# Patient Record
Sex: FEMALE | Race: BLACK OR AFRICAN AMERICAN | NOT HISPANIC OR LATINO | Employment: FULL TIME | URBAN - METROPOLITAN AREA
[De-identification: names, ages, dates, MRNs, and addresses within clinical notes are randomized per-mention and may not be internally consistent; named-entity substitution may affect disease eponyms.]

---

## 2017-03-10 ENCOUNTER — OFFICE VISIT (OUTPATIENT)
Dept: FAMILY MEDICINE | Facility: CLINIC | Age: 32
End: 2017-03-10
Payer: COMMERCIAL

## 2017-03-10 VITALS
SYSTOLIC BLOOD PRESSURE: 98 MMHG | WEIGHT: 198.44 LBS | OXYGEN SATURATION: 97 % | HEIGHT: 67 IN | TEMPERATURE: 98 F | DIASTOLIC BLOOD PRESSURE: 70 MMHG | HEART RATE: 70 BPM | BODY MASS INDEX: 31.15 KG/M2 | RESPIRATION RATE: 16 BRPM

## 2017-03-10 DIAGNOSIS — J35.1 ENLARGED TONSILS: ICD-10-CM

## 2017-03-10 DIAGNOSIS — R22.9 SUBCUTANEOUS MASS: Primary | ICD-10-CM

## 2017-03-10 PROCEDURE — 1160F RVW MEDS BY RX/DR IN RCRD: CPT | Mod: S$GLB,,, | Performed by: FAMILY MEDICINE

## 2017-03-10 PROCEDURE — 99214 OFFICE O/P EST MOD 30 MIN: CPT | Mod: S$GLB,,, | Performed by: FAMILY MEDICINE

## 2017-03-10 PROCEDURE — 99999 PR PBB SHADOW E&M-EST. PATIENT-LVL III: CPT | Mod: PBBFAC,,, | Performed by: FAMILY MEDICINE

## 2017-03-10 NOTE — PROGRESS NOTES
"Chief Complaint   Patient presents with    Sore Throat     off and on since the end of 02/2017    lt side under breast pain     off and on for long time        Rose Mayorga is a 31 y.o. female who presents per the Chief Complaint.  Pt is known to this practice but has not been seen by me previously.  All known chronic medical issues have been documented.          HPI     ROS  Review of Systems   Constitutional: Negative.  Negative for activity change, appetite change, chills, diaphoresis, fatigue, fever and unexpected weight change.   HENT: Positive for trouble swallowing. Negative for congestion, dental problem, drooling, ear discharge, ear pain, hearing loss, nosebleeds, postnasal drip, rhinorrhea, sinus pressure, sneezing and sore throat.    Eyes: Negative for pain and visual disturbance.   Respiratory: Negative for cough, choking and shortness of breath.    Cardiovascular: Negative for chest pain and leg swelling.   Gastrointestinal: Negative for abdominal pain, constipation, diarrhea, nausea and vomiting.   Genitourinary: Negative for difficulty urinating, dysuria, frequency and urgency.   Musculoskeletal: Negative.  Negative for arthralgias, back pain, gait problem, joint swelling and myalgias.   Skin: Negative.         Area under left breast appears bigger than right side   Allergic/Immunologic: Negative for environmental allergies and food allergies.   Neurological: Negative.  Negative for dizziness, seizures, syncope, weakness, light-headedness and headaches.   Psychiatric/Behavioral: Negative.  Negative for confusion, decreased concentration, dysphoric mood and sleep disturbance. The patient is not nervous/anxious.        Physical Exam  Vitals:    03/10/17 1032   BP: 98/70   Pulse: 70   Resp: 16   Temp: 98.3 °F (36.8 °C)    Body mass index is 31.08 kg/(m^2).  Weight: 90 kg (198 lb 6.6 oz)   Height: 5' 7" (170.2 cm)     Physical Exam   Constitutional: She is oriented to person, place, and time. She " appears well-developed and well-nourished. She is active and cooperative.  Non-toxic appearance. She does not have a sickly appearance. She does not appear ill. No distress.   HENT:   Head: Normocephalic and atraumatic.   Right Ear: Hearing and external ear normal. No decreased hearing is noted.   Left Ear: Hearing and external ear normal. No decreased hearing is noted.   Nose: Nose normal. No rhinorrhea or nasal deformity.   Mouth/Throat: Uvula is midline, oropharynx is clear and moist and mucous membranes are normal. She does not have dentures. Normal dentition. Tonsils are 3+ on the right. Tonsils are 3+ on the left.   Eyes: Conjunctivae, EOM and lids are normal. Pupils are equal, round, and reactive to light. Right eye exhibits no chemosis, no discharge and no exudate. No foreign body present in the right eye. Left eye exhibits no chemosis, no discharge and no exudate. No foreign body present in the left eye. No scleral icterus.   Neck: Normal range of motion and full passive range of motion without pain. Neck supple.   Cardiovascular: Normal rate, regular rhythm, S1 normal, S2 normal and normal heart sounds.  Exam reveals no gallop and no friction rub.    No murmur heard.  Pulmonary/Chest: Effort normal and breath sounds normal. No accessory muscle usage. No respiratory distress. She has no decreased breath sounds. She has no wheezes. She has no rhonchi. She has no rales.   Abdominal: Soft. Normal appearance. She exhibits no distension. There is no hepatosplenomegaly. There is no tenderness. There is no rigidity, no rebound and no guarding.   Musculoskeletal: Normal range of motion.   Neurological: She is alert and oriented to person, place, and time. She has normal strength. No cranial nerve deficit or sensory deficit. She exhibits normal muscle tone. She displays no seizure activity. Coordination and gait normal.   Skin: Skin is warm, dry and intact. No rash noted. She is not diaphoretic.        Psychiatric:  She has a normal mood and affect. Her speech is normal and behavior is normal. Judgment and thought content normal. Cognition and memory are normal. She is attentive.       Assessment & Plan    1. Subcutaneous mass  Likely a lipoma; Will order imaging to further evaluate and manage accordingly.   - US Soft Tissue Chest_Upper Back; Future    2. Enlarged tonsils  Refer for evaluation and management. Tonsils appear benign but will refer to ENT due to frequency of symptoms.  - Ambulatory Referral to ENT    Follow up documented    ACTIVE MEDICAL ISSUES:  Documented in Problem List    PAST MEDICAL HISTORY  Documented    PAST SURGICAL HISTORY:  Documented    SOCIAL HISTORY:  Documented    FAMILY HISTORY:  Documented    ALLERGIES AND MEDICATIONS: updated and reviewed.  Documented    Health Maintenance       Date Due Completion Date    Lipid Panel 1985 ---    TETANUS VACCINE 7/10/2002 ---    Influenza Vaccine 8/1/2016 4/23/2015 (Declined)    Override on 4/23/2015: Declined (4/23/15 pt decline)    Pap Smear 6/23/2017 6/23/2016

## 2017-04-20 ENCOUNTER — OFFICE VISIT (OUTPATIENT)
Dept: FAMILY MEDICINE | Facility: CLINIC | Age: 32
End: 2017-04-20
Payer: COMMERCIAL

## 2017-04-20 VITALS
OXYGEN SATURATION: 99 % | RESPIRATION RATE: 12 BRPM | TEMPERATURE: 99 F | SYSTOLIC BLOOD PRESSURE: 110 MMHG | BODY MASS INDEX: 31.01 KG/M2 | HEIGHT: 67 IN | WEIGHT: 197.56 LBS | HEART RATE: 73 BPM | DIASTOLIC BLOOD PRESSURE: 62 MMHG

## 2017-04-20 DIAGNOSIS — J02.0 STREP PHARYNGITIS: Primary | ICD-10-CM

## 2017-04-20 LAB
CTP QC/QA: YES
S PYO RRNA THROAT QL PROBE: POSITIVE

## 2017-04-20 PROCEDURE — 1160F RVW MEDS BY RX/DR IN RCRD: CPT | Mod: S$GLB,,, | Performed by: PHYSICIAN ASSISTANT

## 2017-04-20 PROCEDURE — 99214 OFFICE O/P EST MOD 30 MIN: CPT | Mod: S$GLB,,, | Performed by: PHYSICIAN ASSISTANT

## 2017-04-20 PROCEDURE — 87880 STREP A ASSAY W/OPTIC: CPT | Mod: QW,S$GLB,, | Performed by: PHYSICIAN ASSISTANT

## 2017-04-20 PROCEDURE — 99999 PR PBB SHADOW E&M-EST. PATIENT-LVL III: CPT | Mod: PBBFAC,,, | Performed by: PHYSICIAN ASSISTANT

## 2017-04-20 RX ORDER — AMOXICILLIN 500 MG/1
500 TABLET, FILM COATED ORAL EVERY 12 HOURS
Qty: 20 TABLET | Refills: 0 | Status: SHIPPED | OUTPATIENT
Start: 2017-04-20 | End: 2017-04-30

## 2017-04-20 RX ORDER — PREDNISONE 20 MG/1
20 TABLET ORAL DAILY
Qty: 5 TABLET | Refills: 0 | Status: SHIPPED | OUTPATIENT
Start: 2017-04-20 | End: 2017-04-30

## 2017-04-20 NOTE — PROGRESS NOTES
Subjective:       Patient ID: Rose Mayorga is a 31 y.o. female.    Chief Complaint: URI (having body aches and sore throat)    Sore Throat    This is a new problem. The current episode started in the past 7 days. The problem has been unchanged. The pain is worse on the left side. There has been no fever. The pain is moderate. Associated symptoms include headaches, swollen glands and trouble swallowing. Pertinent negatives include no congestion, coughing, diarrhea or ear pain. She has had no exposure to strep. Treatments tried: cold and flu. The treatment provided mild relief.     Social History     Social History    Marital status: Single     Spouse name: N/A    Number of children: N/A    Years of education: N/A     Occupational History    Not on file.     Social History Main Topics    Smoking status: Never Smoker    Smokeless tobacco: Never Used    Alcohol use Yes      Comment: socially    Drug use: No    Sexual activity: Yes     Partners: Male     Birth control/ protection: Condom     Other Topics Concern    Not on file     Social History Narrative       Review of Systems   HENT: Positive for sore throat (left side worse) and trouble swallowing. Negative for congestion, ear pain, rhinorrhea and sneezing.    Respiratory: Negative for cough.    Gastrointestinal: Negative for diarrhea.   Neurological: Positive for headaches.       Objective:      Physical Exam   Constitutional: She appears well-developed and well-nourished.   HENT:   Head: Normocephalic and atraumatic.   Mouth/Throat: Oropharyngeal exudate, posterior oropharyngeal edema and posterior oropharyngeal erythema present.   Cardiovascular: Normal rate and regular rhythm.    Pulmonary/Chest: Effort normal and breath sounds normal.   Psychiatric: She has a normal mood and affect. Her behavior is normal.   Vitals reviewed.      Assessment:       1. Strep pharyngitis        Plan:         Rose was seen today for uri.    Diagnoses and all orders  for this visit:    Strep pharyngitis  -     amoxicillin (AMOXIL) 500 MG Tab; Take 1 tablet (500 mg total) by mouth every 12 (twelve) hours.  -     predniSONE (DELTASONE) 20 MG tablet; Take 1 tablet (20 mg total) by mouth once daily.  -     Rapid strep positive   -     Increase fluids, tylenol or ibuprofen for fever and pain

## 2017-04-20 NOTE — LETTER
April 20, 2017    Rose Mayorga  [address] [place] [address]             [facility] [address] [place] [address]  Phone: [phone]  Fax: [phone] Rose Mayorga was seen in my clinic on 4/20/17. Please excuse her absence.  She may return to work on 4/24/17.    If you have any questions or concerns, please don't hesitate to call.    Sincerely,       Lidia Pedraza PA-C

## 2017-04-20 NOTE — MR AVS SNAPSHOT
[facility]  [address]  [place] [address]  Phone: [phone]  Fax: [phone]                  Rose Mayorga   2017 11:00 AM   Office Visit    Description:  Female : 1985   Provider:  Lidia Pedraza PA-C   Department:  [facility]           Reason for Visit     URI           Diagnoses this Visit        Comments    Strep pharyngitis    -  Primary            To Do List           Goals (5 Years of Data)     None       These Medications        Disp Refills Start End    amoxicillin (AMOXIL) 500 MG Tab 20 tablet 0 2017    Take 1 tablet (500 mg total) by mouth every 12 (twelve) hours. - Oral    Pharmacy: [facility] [id number] [place] [address] [place] [address] [facility] Ph #: [phone]         [facility] On [facility]     [facility] On Call Nurse Care Line -  Assistance  Unless otherwise directed by your provider, please contact [facility], our nurse care line that is available for  assistance.     Registered nurses in the [facility] On Call Center provide: appointment scheduling, clinical advisement, health education, and other advisory services.  Call: [phone] (toll free)               Medications           START taking these NEW medications        Refills    amoxicillin (AMOXIL) 500 MG Tab 0    Sig: Take 1 tablet (500 mg total) by mouth every 12 (twelve) hours.    Class: Normal    Route: Oral           Verify that the below list of medications is an accurate representation of the medications you are currently taking.  If none reported, the list may be blank. If incorrect, please contact your healthcare provider. Carry this list with you in case of emergency.           Current Medications     cetirizine (ZYRTEC) 10 MG tablet Take 1 tablet (10 mg total) by mouth once daily.    amoxicillin (AMOXIL) 500 MG Tab Take 1 tablet (500 mg total) by mouth every 12 (twelve) hours.           Clinical  "Reference Information           Your Vitals Were     BP Pulse Temp Resp Height Weight    110/62 (BP Location: Left arm, Patient Position: Sitting, BP Method: Manual) 73 98.8 °F (37.1 °C) (Oral) 12 5' 7" (1.702 m) 89.6 kg (197 lb 8.5 oz)    Last Period SpO2 BMI          04/11/2017 99% 30.94 kg/m2        Blood Pressure          Most Recent Value    BP  110/62      Allergies as of 4/20/2017     No Known Allergies      Immunizations Administered on Date of Encounter - 4/20/2017     None      [email or web address] Sign-Up     Activating your [facility] account is as easy as 1-2-3!     1) Visit [email or web address], select Sign Up Now, enter this activation code and your date of birth, then select Next.  Activation code not generated  Current Patient Portal Status: Account disabled      2) Create a username and password to use when you visit [facility] in the future and select a security question in case you lose your password and select Next.    3) Enter your e-mail address and click Sign Up!    Additional Information  If you have questions, please e-mail [email or web address] or call [phone] to talk to our [facility] staff. Remember, [facility] is NOT to be used for urgent needs. For medical emergencies, dial 911.         Language Assistance Services     ATTENTION: Language assistance services are available, free of charge. Please call [phone].      ATENCIÓN: Si habla español, tiene a smith disposición servicios gratuitos de asistencia lingüística. Llame al [phone].     CHÚ Ý: N?u b?n nói Ti?ng Vi?t, có các d?ch v? h? tr? ngôn ng? mi?n phí dành cho b?n. G?i s? [phone].         [facility] complies with applicable Federal civil rights laws and does not discriminate on the basis of race, color, national origin, age, disability, or sex.        "

## 2018-03-30 ENCOUNTER — OFFICE VISIT (OUTPATIENT)
Dept: URGENT CARE | Facility: CLINIC | Age: 33
End: 2018-03-30
Payer: COMMERCIAL

## 2018-03-30 VITALS
WEIGHT: 211 LBS | DIASTOLIC BLOOD PRESSURE: 78 MMHG | RESPIRATION RATE: 16 BRPM | HEIGHT: 67 IN | HEART RATE: 58 BPM | SYSTOLIC BLOOD PRESSURE: 112 MMHG | OXYGEN SATURATION: 99 % | BODY MASS INDEX: 33.12 KG/M2 | TEMPERATURE: 99 F

## 2018-03-30 DIAGNOSIS — J03.90 TONSILLITIS: ICD-10-CM

## 2018-03-30 DIAGNOSIS — J02.9 SORE THROAT: Primary | ICD-10-CM

## 2018-03-30 LAB
CTP QC/QA: YES
GLUCOSE SERPL-MCNC: 78 MG/DL (ref 70–110)
S PYO RRNA THROAT QL PROBE: NEGATIVE

## 2018-03-30 PROCEDURE — 99214 OFFICE O/P EST MOD 30 MIN: CPT | Mod: 25,S$GLB,, | Performed by: NURSE PRACTITIONER

## 2018-03-30 PROCEDURE — 87147 CULTURE TYPE IMMUNOLOGIC: CPT

## 2018-03-30 PROCEDURE — 87070 CULTURE OTHR SPECIMN AEROBIC: CPT

## 2018-03-30 PROCEDURE — 96372 THER/PROPH/DIAG INJ SC/IM: CPT | Mod: S$GLB,,, | Performed by: FAMILY MEDICINE

## 2018-03-30 PROCEDURE — 82948 REAGENT STRIP/BLOOD GLUCOSE: CPT | Mod: S$GLB,,, | Performed by: NURSE PRACTITIONER

## 2018-03-30 PROCEDURE — 87880 STREP A ASSAY W/OPTIC: CPT | Mod: QW,S$GLB,, | Performed by: NURSE PRACTITIONER

## 2018-03-30 RX ORDER — BETAMETHASONE SODIUM PHOSPHATE AND BETAMETHASONE ACETATE 3; 3 MG/ML; MG/ML
6 INJECTION, SUSPENSION INTRA-ARTICULAR; INTRALESIONAL; INTRAMUSCULAR; SOFT TISSUE
Status: COMPLETED | OUTPATIENT
Start: 2018-03-30 | End: 2018-03-30

## 2018-03-30 RX ORDER — AMOXICILLIN 875 MG/1
875 TABLET, FILM COATED ORAL 2 TIMES DAILY
Qty: 20 TABLET | Refills: 0 | Status: SHIPPED | OUTPATIENT
Start: 2018-03-30 | End: 2018-04-09

## 2018-03-30 RX ADMIN — BETAMETHASONE SODIUM PHOSPHATE AND BETAMETHASONE ACETATE 6 MG: 3; 3 INJECTION, SUSPENSION INTRA-ARTICULAR; INTRALESIONAL; INTRAMUSCULAR; SOFT TISSUE at 11:03

## 2018-03-30 NOTE — LETTER
March 30, 2018      [facility]  [facility] [place] [facility] [address]  Phone: [phone]  Fax: [phone]       Patient: Rose Mayorga   YOB: 1985  Date of Visit: 03/30/2018    To Whom It May Concern:    Daija Mayorga  was at [facility] on 03/30/2018. She may return to work/school on 4/2/18 with no restrictions. If you have any questions or concerns, or if I can be of further assistance, please do not hesitate to contact me.    Sincerely,    Leah De La Cruz NP

## 2018-03-30 NOTE — PROGRESS NOTES
"Subjective:       Patient ID: Rose Mayorga is a 32 y.o. female.    Vitals:  height is 5' 7" (1.702 m) and weight is 95.7 kg (211 lb). Her oral temperature is 98.7 °F (37.1 °C). Her blood pressure is 112/78 and her pulse is 58 (abnormal). Her respiration is 16 and oxygen saturation is 99%.     Chief Complaint: Sore Throat    Pt presents today with a sore throat for 3 days now. Pt states she also has had body aches and fatigue with slight chills. Pt denies any URI symptoms. Pt denies any recent otc drugs. Pt works at the [employer].       Sore Throat    This is a new problem. The current episode started in the past 7 days. The problem has been unchanged. The pain is worse on the right side. There has been no fever. The pain is at a severity of 6/10. The pain is mild. Associated symptoms include neck pain. Pertinent negatives include no abdominal pain, congestion, coughing, diarrhea, drooling, ear pain, headaches, hoarse voice, plugged ear sensation or shortness of breath. She has tried nothing for the symptoms.     Review of Systems   Constitution: Positive for chills and malaise/fatigue. Negative for weakness.   HENT: Positive for sore throat. Negative for congestion, drooling, ear pain and hoarse voice.    Eyes: Negative for discharge.   Respiratory: Negative for cough and shortness of breath.    Skin: Negative for flushing.   Musculoskeletal: Positive for neck pain.   Gastrointestinal: Negative for abdominal pain and diarrhea.   Genitourinary: Negative for dysuria.   Neurological: Negative for headaches.       Objective:      Physical Exam   Constitutional: She is oriented to person, place, and time. Vital signs are normal. She appears well-developed and well-nourished. She is cooperative.  Non-toxic appearance. She appears ill. No distress.   HENT:   Head: Normocephalic and atraumatic.   Right Ear: Hearing, tympanic membrane, external ear and ear canal normal.   Left Ear: Hearing, tympanic membrane, external " ear and ear canal normal.   Nose: Nose normal. No mucosal edema, rhinorrhea or nasal deformity. No epistaxis. Right sinus exhibits no maxillary sinus tenderness and no frontal sinus tenderness. Left sinus exhibits no maxillary sinus tenderness and no frontal sinus tenderness.   Mouth/Throat: Uvula is midline and mucous membranes are normal. No trismus in the jaw. Normal dentition. No uvula swelling. Posterior oropharyngeal erythema present. Tonsils are 2+ on the right. Tonsils are 2+ on the left. Tonsillar exudate (noted on the right tonsil).   Eyes: Conjunctivae, EOM and lids are normal. Pupils are equal, round, and reactive to light. No scleral icterus.   Sclera clear bilat   Neck: Trachea normal, normal range of motion, full passive range of motion without pain and phonation normal. Neck supple.   Cardiovascular: Normal rate, regular rhythm, normal heart sounds, intact distal pulses and normal pulses.    Pulmonary/Chest: Effort normal and breath sounds normal. No respiratory distress.   Abdominal: Soft. Normal appearance and bowel sounds are normal. She exhibits no distension. There is no tenderness.   Musculoskeletal: Normal range of motion. She exhibits no edema or deformity.   Lymphadenopathy:     She has cervical adenopathy.        Right cervical: Superficial cervical adenopathy present.        Left cervical: Superficial cervical adenopathy present.   Neurological: She is alert and oriented to person, place, and time. She exhibits normal muscle tone. Coordination normal.   Skin: Skin is warm, dry and intact. She is not diaphoretic. No pallor.   Psychiatric: She has a normal mood and affect. Her speech is normal and behavior is normal. Judgment and thought content normal. Cognition and memory are normal.   Nursing note and vitals reviewed.         Results for orders placed or performed in visit on 03/30/18   POCT rapid strep A   Result Value Ref Range    Rapid Strep A Screen Negative Negative    Quality  Control Acceptable Yes    POCT glucose   Result Value Ref Range    POC Glucose 78 70 - 110 mg/dL      Assessment:       1. Sore throat    2. Tonsillitis        Plan:         Sore throat  -     POCT rapid strep A  -     (Magic mouthwash) 1:1:1 Benadryl 12.5mg/5ml liq, aluminum & magnesium hydroxide-simehticone (Maalox), lidocaine viscous 2%; Swish and spit 5 mLs every 4 (four) hours as needed. For sore throat  Dispense: 90 mL; Refill: 0    Tonsillitis  -     amoxicillin (AMOXIL) 875 MG tablet; Take 1 tablet (875 mg total) by mouth 2 (two) times daily.  Dispense: 20 tablet; Refill: 0  -     POCT glucose      Patient Instructions                                                         Pharyngitis   If your condition worsens or fails to improve we recommend that you receive another evaluation at the ER immediately or contact your PCP to discuss your concerns or return here. You must understand that you've received an urgent care treatment only and that you may be released before all your medical problems are known or treated. You the patient will arrange for followup care as instructed.   The majority of all sore throats or tonsillitis are viral and antibiotics will not treat this.     If the strep test performed in office was Positive:  - Complete the full course of antibiotics given  - Drink plenty of cool liquids while avoid any beverage or food that can irritate your throat (acidic, spicy or salty foods).  - Throw away your toothbrush now and when you complete your antibiotics.    If the strep culture was done and returns negative in 3-5 days and you are still having a sore throat, you may need to get a mono spot test done or repeated.   Tylenol or ibuprofen for pain may help as long as you are not allergic to these meds or have a medical condition such as stomach ulcers, liver or kidney disease or taking blood thinners etc that would   prevent you from using these medications.   Rest and fluids will help as well.    If you were prescribed antibiotics and are female and on BCP use additional methods to prevent pregnancy while on the antibiotics and for one cycle after.     Pharyngitis (Sore Throat), Report Pending    Pharyngitis (sore throat) is often due to a virus. It can also be caused by the streptococcus, or strep, bacterium, often called strep throat. Both viral and strep infections can cause throat pain that is worse when swallowing, aching all over with headache, and fever. Both types of infections are contagious. They may be spread by coughing, kissing, or touching others after touching your mouth or nose.  A test has been done to find out whether you (or your child, if your child is the patient) have strep throat. Call this facility or your healthcare provider if you were not given your test results. If the test is positive for strep infection, you will need to take antibiotic medicines. A prescription can be called into your pharmacy at that time. If the test is negative, you probably have a viral pharyngitis. This does not need to be treated with antibiotics. Until you receive the results of the strep test, you should stay home from work. If your child is being tested, he or she should stay home from school.  Home care  · Rest at home. Drink plenty of fluids so you won't get dehydrated.  · If the test is positive for strep, don't go to work or school for the first 2 days of taking the antibiotics. After this time, you will not be contagious. You can then return to work or school if you are feeling better.   · Take the antibiotic medicine for the full 10 days, even if you feel better. This is very important to make sure the infection is treated. It is also important to prevent drug-resistant germs from developing. If you were given an antibiotic shot, you won't need more antibiotics.  · For children: Use acetaminophen for fever, fussiness, or discomfort. In infants older than 6 months of age, you may use ibuprofen  instead of acetaminophen. Talk with your child's healthcare provider before giving these medicines if your child has chronic liver or kidney disease or ever had a stomach ulcer or GI bleeding. Never give aspirin to a child under 18 years of age who is ill with a fever. It may cause severe liver damage.  · For adults: Use acetaminophen or ibuprofen to control pain or fever, unless another medicine was prescribed for this. Talk with your healthcare provider before taking these medicines if you have chronic liver or kidney disease or ever had a stomach ulcer or GI bleeding.  · Use throat lozenges or numbing throat sprays to help reduce pain. Gargling with warm salt water will also help reduce throat pain. For this, dissolve 1/2 teaspoon of salt in 1 glass of warm water. To help soothe a sore throat, children can sip on juice or a popsicle. Children 5 years and older can also suck on a lollipop or hard candy.  · Don't eat salty or spicy foods. These can irritate the throat.  Follow-up care  Follow up with your healthcare provider or our staff if you don't get better over the next week.  When to seek medical advice  Call your healthcare provider right away if any of these occur:  · Fever as directed by your healthcare provider. For children, seek care if:  ¨ Your child is of any age and has repeated fevers above 104°F (40°C).  ¨ Your child is younger than 2 years of age and has a fever of 100.4°F (38°C) that continues for more than 1 day.  ¨ Your child is 2 years old or older and has a fever of 100.4°F (38°C) that continues for more than 3 days.  · New or worsening ear pain, sinus pain, or headache  · Painful lumps in the back of neck  · Stiff neck  · Lymph nodes are getting larger  · Inability to swallow liquids, excessive drooling, or inability to open mouth wide due to throat pain  · Signs of dehydration (very dark urine or no urine, sunken eyes, dizziness)  · Trouble breathing or noisy breathing  · Muffled  voice  · New rash  · Child appears to be getting sicker  Date Last Reviewed: 4/13/2015  © 2968-7580 [employer]. [address] [place] [address], [place] [address]. All rights reserved. This information is not intended as a substitute for professional medical care. Always follow your healthcare professional's instructions.        Steroid Injection  You received a steroid shot today - As discussed, this can elevate your blood pressure, elevate your blood sugar, water weight gain, nervous energy, redness to the face and dimpling of the skin where the shot goes in.

## 2018-03-30 NOTE — PATIENT INSTRUCTIONS
Pharyngitis   If your condition worsens or fails to improve we recommend that you receive another evaluation at the ER immediately or contact your PCP to discuss your concerns or return here. You must understand that you've received an urgent care treatment only and that you may be released before all your medical problems are known or treated. You the patient will arrange for followup care as instructed.   The majority of all sore throats or tonsillitis are viral and antibiotics will not treat this.     If the strep test performed in office was Positive:  - Complete the full course of antibiotics given  - Drink plenty of cool liquids while avoid any beverage or food that can irritate your throat (acidic, spicy or salty foods).  - Throw away your toothbrush now and when you complete your antibiotics.    If the strep culture was done and returns negative in 3-5 days and you are still having a sore throat, you may need to get a mono spot test done or repeated.   Tylenol or ibuprofen for pain may help as long as you are not allergic to these meds or have a medical condition such as stomach ulcers, liver or kidney disease or taking blood thinners etc that would   prevent you from using these medications.   Rest and fluids will help as well.   If you were prescribed antibiotics and are female and on BCP use additional methods to prevent pregnancy while on the antibiotics and for one cycle after.     Steroid Injection  You received a steroid shot today - As discussed, this can elevate your blood pressure, elevate your blood sugar, water weight gain, nervous energy, redness to the face and dimpling of the skin where the shot goes in.     Pharyngitis (Sore Throat), Report Pending    Pharyngitis (sore throat) is often due to a virus. It can also be caused by the streptococcus, or strep, bacterium, often called strep throat. Both viral and strep infections can cause throat  pain that is worse when swallowing, aching all over with headache, and fever. Both types of infections are contagious. They may be spread by coughing, kissing, or touching others after touching your mouth or nose.  A test has been done to find out whether you (or your child, if your child is the patient) have strep throat. Call this facility or your healthcare provider if you were not given your test results. If the test is positive for strep infection, you will need to take antibiotic medicines. A prescription can be called into your pharmacy at that time. If the test is negative, you probably have a viral pharyngitis. This does not need to be treated with antibiotics. Until you receive the results of the strep test, you should stay home from work. If your child is being tested, he or she should stay home from school.  Home care  · Rest at home. Drink plenty of fluids so you won't get dehydrated.  · If the test is positive for strep, don't go to work or school for the first 2 days of taking the antibiotics. After this time, you will not be contagious. You can then return to work or school if you are feeling better.   · Take the antibiotic medicine for the full 10 days, even if you feel better. This is very important to make sure the infection is treated. It is also important to prevent drug-resistant germs from developing. If you were given an antibiotic shot, you won't need more antibiotics.  · For children: Use acetaminophen for fever, fussiness, or discomfort. In infants older than 6 months of age, you may use ibuprofen instead of acetaminophen. Talk with your child's healthcare provider before giving these medicines if your child has chronic liver or kidney disease or ever had a stomach ulcer or GI bleeding. Never give aspirin to a child under 18 years of age who is ill with a fever. It may cause severe liver damage.  · For adults: Use acetaminophen or ibuprofen to control pain or fever, unless another medicine  was prescribed for this. Talk with your healthcare provider before taking these medicines if you have chronic liver or kidney disease or ever had a stomach ulcer or GI bleeding.  · Use throat lozenges or numbing throat sprays to help reduce pain. Gargling with warm salt water will also help reduce throat pain. For this, dissolve 1/2 teaspoon of salt in 1 glass of warm water. To help soothe a sore throat, children can sip on juice or a popsicle. Children 5 years and older can also suck on a lollipop or hard candy.  · Don't eat salty or spicy foods. These can irritate the throat.  Follow-up care  Follow up with your healthcare provider or our staff if you don't get better over the next week.  When to seek medical advice  Call your healthcare provider right away if any of these occur:  · Fever as directed by your healthcare provider. For children, seek care if:  ¨ Your child is of any age and has repeated fevers above 104°F (40°C).  ¨ Your child is younger than 2 years of age and has a fever of 100.4°F (38°C) that continues for more than 1 day.  ¨ Your child is 2 years old or older and has a fever of 100.4°F (38°C) that continues for more than 3 days.  · New or worsening ear pain, sinus pain, or headache  · Painful lumps in the back of neck  · Stiff neck  · Lymph nodes are getting larger  · Inability to swallow liquids, excessive drooling, or inability to open mouth wide due to throat pain  · Signs of dehydration (very dark urine or no urine, sunken eyes, dizziness)  · Trouble breathing or noisy breathing  · Muffled voice  · New rash  · Child appears to be getting sicker  Date Last Reviewed: 4/13/2015  © 1275-2133 [employer]. [address] [place] [address], [place] [address]. All rights reserved. This information is not intended as a substitute for professional medical care. Always follow your healthcare professional's instructions.

## 2018-04-02 ENCOUNTER — TELEPHONE (OUTPATIENT)
Dept: URGENT CARE | Facility: CLINIC | Age: 33
End: 2018-04-02

## 2018-04-02 LAB — BACTERIA THROAT CULT: NORMAL

## 2018-04-02 NOTE — TELEPHONE ENCOUNTER
"Spoke with patient who was calling about her results.  Patient informed that the culture results are not final as of today.  However, she was advised to come back in for a mono spot test on today to rule out other causes of sore throat.  Patient asked if there would be an another copay for her visit.  Advised patient that the evaluation would be a new visit and copay for the service.  Patient states "Thats okay then, I will wait for the results".  "

## 2018-04-02 NOTE — TELEPHONE ENCOUNTER
gave pt negative lab results. pt throat is feeling better, but still having a cough. Pt was told that she can try mucinex or delsym. Denied a post nasal drip.

## 2018-04-02 NOTE — TELEPHONE ENCOUNTER
----- Message from Dav Lucas MD sent at 4/2/2018  3:15 PM CDT -----  Please call the patient regarding her normal result.

## 2018-04-16 ENCOUNTER — OFFICE VISIT (OUTPATIENT)
Dept: FAMILY MEDICINE | Facility: CLINIC | Age: 33
End: 2018-04-16
Payer: COMMERCIAL

## 2018-04-16 VITALS
HEART RATE: 86 BPM | RESPIRATION RATE: 18 BRPM | WEIGHT: 209.69 LBS | TEMPERATURE: 99 F | SYSTOLIC BLOOD PRESSURE: 110 MMHG | DIASTOLIC BLOOD PRESSURE: 70 MMHG | OXYGEN SATURATION: 99 % | HEIGHT: 67 IN | BODY MASS INDEX: 32.91 KG/M2

## 2018-04-16 DIAGNOSIS — R22.2 MASS OF CHEST WALL, LEFT: Primary | ICD-10-CM

## 2018-04-16 DIAGNOSIS — M21.959 ACQUIRED DEFORMITY OF HIP, UNSPECIFIED LATERALITY: ICD-10-CM

## 2018-04-16 DIAGNOSIS — R05.9 COUGH: ICD-10-CM

## 2018-04-16 PROCEDURE — 99214 OFFICE O/P EST MOD 30 MIN: CPT | Mod: S$GLB,,, | Performed by: PHYSICIAN ASSISTANT

## 2018-04-16 PROCEDURE — 99999 PR PBB SHADOW E&M-EST. PATIENT-LVL V: CPT | Mod: PBBFAC,,, | Performed by: PHYSICIAN ASSISTANT

## 2018-04-16 RX ORDER — LEVOCETIRIZINE DIHYDROCHLORIDE 5 MG/1
5 TABLET, FILM COATED ORAL NIGHTLY
Qty: 30 TABLET | Refills: 0 | Status: SHIPPED | OUTPATIENT
Start: 2018-04-16 | End: 2018-07-30

## 2018-04-16 RX ORDER — NORETHINDRONE ACETATE AND ETHINYL ESTRADIOL AND FERROUS FUMARATE 1MG-20(21)
KIT ORAL
Refills: 5 | COMMUNITY
Start: 2018-03-30 | End: 2018-07-30

## 2018-04-16 RX ORDER — BENZONATATE 200 MG/1
200 CAPSULE ORAL 3 TIMES DAILY PRN
Qty: 30 CAPSULE | Refills: 0 | Status: SHIPPED | OUTPATIENT
Start: 2018-04-16 | End: 2018-04-26

## 2018-04-16 RX ORDER — METFORMIN HYDROCHLORIDE 500 MG/1
TABLET ORAL
Refills: 1 | COMMUNITY
Start: 2018-03-30 | End: 2018-07-30

## 2018-04-18 ENCOUNTER — TELEPHONE (OUTPATIENT)
Dept: FAMILY MEDICINE | Facility: CLINIC | Age: 33
End: 2018-04-18

## 2018-04-18 DIAGNOSIS — M21.959 ACQUIRED DEFORMITY OF HIP, UNSPECIFIED LATERALITY: Primary | ICD-10-CM

## 2018-04-23 ENCOUNTER — TELEPHONE (OUTPATIENT)
Dept: FAMILY MEDICINE | Facility: CLINIC | Age: 33
End: 2018-04-23

## 2018-04-23 NOTE — TELEPHONE ENCOUNTER
Left message with Plastic Surgery phone number [phone], so the patient can call and schedule whenever she is ready. That department schedules their own appointments. Thanks.

## 2018-05-01 ENCOUNTER — HOSPITAL ENCOUNTER (OUTPATIENT)
Dept: RADIOLOGY | Facility: HOSPITAL | Age: 33
Discharge: HOME | End: 2018-05-01
Attending: PHYSICIAN ASSISTANT
Payer: COMMERCIAL

## 2018-05-01 DIAGNOSIS — R22.2 MASS OF CHEST WALL, LEFT: ICD-10-CM

## 2018-05-01 PROCEDURE — 76604 US EXAM CHEST: CPT | Mod: 26,,, | Performed by: RADIOLOGY

## 2018-05-01 PROCEDURE — 76604 US EXAM CHEST: CPT | Mod: TC

## 2019-02-19 ENCOUNTER — OFFICE VISIT (OUTPATIENT)
Dept: FAMILY MEDICINE | Facility: CLINIC | Age: 34
End: 2019-02-19
Payer: COMMERCIAL

## 2019-02-19 VITALS
HEIGHT: 67 IN | BODY MASS INDEX: 31.35 KG/M2 | SYSTOLIC BLOOD PRESSURE: 104 MMHG | DIASTOLIC BLOOD PRESSURE: 60 MMHG | HEART RATE: 73 BPM | TEMPERATURE: 100 F | WEIGHT: 199.75 LBS | OXYGEN SATURATION: 96 % | RESPIRATION RATE: 18 BRPM

## 2019-02-19 DIAGNOSIS — R07.0 THROAT PAIN IN ADULT: ICD-10-CM

## 2019-02-19 DIAGNOSIS — J02.0 STREPTOCOCCAL PHARYNGITIS: ICD-10-CM

## 2019-02-19 DIAGNOSIS — R52 GENERALIZED BODY ACHES: Primary | ICD-10-CM

## 2019-02-19 LAB
CTP QC/QA: YES
CTP QC/QA: YES
FLUAV AG NPH QL: NEGATIVE
FLUBV AG NPH QL: NEGATIVE
S PYO RRNA THROAT QL PROBE: POSITIVE

## 2019-02-19 PROCEDURE — 3008F BODY MASS INDEX DOCD: CPT | Mod: CPTII,S$GLB,, | Performed by: FAMILY MEDICINE

## 2019-02-19 PROCEDURE — 96372 THER/PROPH/DIAG INJ SC/IM: CPT | Mod: S$GLB,,, | Performed by: FAMILY MEDICINE

## 2019-02-19 PROCEDURE — 99999 PR PBB SHADOW E&M-EST. PATIENT-LVL III: ICD-10-PCS | Mod: PBBFAC,,, | Performed by: FAMILY MEDICINE

## 2019-02-19 PROCEDURE — 99214 OFFICE O/P EST MOD 30 MIN: CPT | Mod: 25,S$GLB,, | Performed by: FAMILY MEDICINE

## 2019-02-19 PROCEDURE — 99999 PR PBB SHADOW E&M-EST. PATIENT-LVL III: CPT | Mod: PBBFAC,,, | Performed by: FAMILY MEDICINE

## 2019-02-19 PROCEDURE — 87880 STREP A ASSAY W/OPTIC: CPT | Mod: QW,S$GLB,, | Performed by: FAMILY MEDICINE

## 2019-02-19 PROCEDURE — 99214 PR OFFICE/OUTPT VISIT, EST, LEVL IV, 30-39 MIN: ICD-10-PCS | Mod: 25,S$GLB,, | Performed by: FAMILY MEDICINE

## 2019-02-19 PROCEDURE — 87804 POCT INFLUENZA A/B: ICD-10-PCS | Mod: QW,S$GLB,, | Performed by: FAMILY MEDICINE

## 2019-02-19 PROCEDURE — 87880 POCT RAPID STREP A: ICD-10-PCS | Mod: QW,S$GLB,, | Performed by: FAMILY MEDICINE

## 2019-02-19 PROCEDURE — 3008F PR BODY MASS INDEX (BMI) DOCUMENTED: ICD-10-PCS | Mod: CPTII,S$GLB,, | Performed by: FAMILY MEDICINE

## 2019-02-19 PROCEDURE — 87804 INFLUENZA ASSAY W/OPTIC: CPT | Mod: QW,S$GLB,, | Performed by: FAMILY MEDICINE

## 2019-02-19 PROCEDURE — 96372 PR INJECTION,THERAP/PROPH/DIAG2ST, IM OR SUBCUT: ICD-10-PCS | Mod: S$GLB,,, | Performed by: FAMILY MEDICINE

## 2019-02-19 RX ORDER — IBUPROFEN 600 MG/1
600 TABLET ORAL 3 TIMES DAILY
Qty: 40 TABLET | Refills: 1 | OUTPATIENT
Start: 2019-02-19 | End: 2021-08-28

## 2019-02-19 NOTE — PROGRESS NOTES
Penicillin G 1.2 Units administered IM to right ventrogluteal. Tolerated well. No adverse reaction noted.

## 2019-02-19 NOTE — LETTER
February 19, 2019      [facility] [address] [place] [address]  Phone: [phone]  Fax: [phone]       Patient: Rose Mayorga   YOB: 1985  Date of Visit: 02/19/2019    To Whom It May Concern:    Daija Mayorga  was at [facility] on 02/19/2019. She may return to work/school on 2/22/2019 with no restrictions. She has been diagnosed with strept pharyngitis (strept throat). If you have any questions or concerns, or if I can be of further assistance, please do not hesitate to contact me.    Sincerely,          Lj Frost MD

## 2019-02-19 NOTE — PROGRESS NOTES
Chief Complaint   Patient presents with    Generalized Body Aches    Chills    Sore Throat       HPI  Rose Mayorga is a 33 y.o. female with multiple medical diagnoses as listed in the medical history and problem list that presents for URI.    URI - 1 week hx, prog worsening chills, sore throat, minimal cough, +fever.         Pt is known to me and was last seen by me on Visit date not found.    PAST MEDICAL HISTORY:  Past Medical History:   Diagnosis Date    History of cervical dysplasia     PCOS (polycystic ovarian syndrome)     Pre-diabetes        PAST SURGICAL HISTORY:  Past Surgical History:   Procedure Laterality Date    CRYOTHERAPY         SOCIAL HISTORY:  Social History     Socioeconomic History    Marital status: Single     Spouse name: Not on file    Number of children: Not on file    Years of education: Not on file    Highest education level: Not on file   Social Needs    Financial resource strain: Not on file    Food insecurity - worry: Not on file    Food insecurity - inability: Not on file    Transportation needs - medical: Not on file    Transportation needs - non-medical: Not on file   Occupational History    Not on file   Tobacco Use    Smoking status: Never Smoker    Smokeless tobacco: Never Used   Substance and Sexual Activity    Alcohol use: Yes     Comment: socially    Drug use: No    Sexual activity: Yes     Partners: Male     Birth control/protection: Condom   Other Topics Concern    Not on file   Social History Narrative    Not on file       FAMILY HISTORY:  Family History   Problem Relation Age of Onset    No Known Problems Mother     No Known Problems Father     Cancer Maternal Grandmother         breast    No Known Problems Maternal Grandfather     No Known Problems Paternal Grandmother     No Known Problems Paternal Grandfather     Breast cancer Neg Hx     Colon cancer Neg Hx     Ovarian cancer Neg Hx        ALLERGIES AND MEDICATIONS: updated and  "reviewed.  Review of patient's allergies indicates:  No Known Allergies  Current Outpatient Medications   Medication Sig Dispense Refill    ibuprofen (ADVIL,MOTRIN) 600 MG tablet Take 1 tablet (600 mg total) by mouth 3 (three) times daily. 40 tablet 1    levonorgestrel (MIRENA) 20 mcg/24 hr (5 years) IUD 1 each by Intrauterine route once.       No current facility-administered medications for this visit.        ROS  Review of Systems   Constitutional: Positive for fatigue and fever. Negative for activity change and appetite change.   HENT: Positive for rhinorrhea and sore throat. Negative for congestion, postnasal drip, sinus pressure and sneezing.    Eyes: Positive for itching.   Respiratory: Negative for cough, shortness of breath and wheezing.    Cardiovascular: Negative for chest pain.   Gastrointestinal: Negative for abdominal pain, diarrhea and nausea.   Endocrine: Negative for polydipsia.   Genitourinary: Negative for dysuria.   Musculoskeletal: Negative for neck stiffness.   Skin: Negative for rash.   Neurological: Negative for numbness.   Psychiatric/Behavioral: Negative for agitation and dysphoric mood.       Physical Exam  Vitals:    02/19/19 1107   BP: 104/60   Pulse: 73   Resp: 18   Temp: 99.8 °F (37.7 °C)    Body mass index is 31.28 kg/m².  Weight: 90.6 kg (199 lb 11.8 oz)   Height: 5' 7" (170.2 cm)     Physical Exam   Constitutional: She is oriented to person, place, and time. She appears well-developed and well-nourished.   HENT:   Head: Normocephalic.   Neurological: She is alert and oriented to person, place, and time.   Psychiatric: She has a normal mood and affect. Her behavior is normal. Judgment and thought content normal.       Health Maintenance       Date Due Completion Date    TETANUS VACCINE 07/10/2002 ---    Influenza Vaccine 08/01/2018 4/23/2015 (Declined)    Override on 4/23/2015: Declined (4/23/15 pt decline)    Pap Smear 07/30/2019 7/30/2018          Assessment & Plan    Generalized " body aches  -     POCT Influenza A/B    Throat pain in adult  -     POCT Rapid Strep A    Streptococcal pharyngitis  -     penicillin G benzathine (BICILLIN [place]) injection 1.2 Million Units  -     ibuprofen (ADVIL,MOTRIN) 600 MG tablet; Take 1 tablet (600 mg total) by mouth 3 (three) times daily.  Dispense: 40 tablet; Refill: 1  - Will treat at this time.         Follow-up in about 6 months (around 8/19/2019).

## 2019-02-19 NOTE — LETTER
February 19, 2019      [facility] [address] [place] [address]  Phone: [phone]  Fax: [phone]       Patient: Rose Mayorga   YOB: 1985  Date of Visit: 02/19/2019    To Whom It May Concern:    Daija Mayorga  was at [facility] on 02/19/2019. She may return to work/school on 2/22/2019 with no restrictions. If you have any questions or concerns, or if I can be of further assistance, please do not hesitate to contact me.    Sincerely,          Lj Frost MD

## 2019-08-22 ENCOUNTER — OFFICE VISIT (OUTPATIENT)
Dept: FAMILY MEDICINE | Facility: CLINIC | Age: 34
End: 2019-08-22
Payer: COMMERCIAL

## 2019-08-22 ENCOUNTER — TELEPHONE (OUTPATIENT)
Dept: FAMILY MEDICINE | Facility: CLINIC | Age: 34
End: 2019-08-22

## 2019-08-22 VITALS
TEMPERATURE: 100 F | HEART RATE: 98 BPM | SYSTOLIC BLOOD PRESSURE: 100 MMHG | WEIGHT: 206.13 LBS | DIASTOLIC BLOOD PRESSURE: 62 MMHG | BODY MASS INDEX: 32.35 KG/M2 | RESPIRATION RATE: 16 BRPM | OXYGEN SATURATION: 96 % | HEIGHT: 67 IN

## 2019-08-22 DIAGNOSIS — J02.0 STREP THROAT: Primary | ICD-10-CM

## 2019-08-22 PROCEDURE — 3008F PR BODY MASS INDEX (BMI) DOCUMENTED: ICD-10-PCS | Mod: CPTII,S$GLB,, | Performed by: FAMILY MEDICINE

## 2019-08-22 PROCEDURE — 99999 PR PBB SHADOW E&M-EST. PATIENT-LVL IV: CPT | Mod: PBBFAC,,, | Performed by: FAMILY MEDICINE

## 2019-08-22 PROCEDURE — 99999 PR PBB SHADOW E&M-EST. PATIENT-LVL IV: ICD-10-PCS | Mod: PBBFAC,,, | Performed by: FAMILY MEDICINE

## 2019-08-22 PROCEDURE — 99214 OFFICE O/P EST MOD 30 MIN: CPT | Mod: S$GLB,,, | Performed by: FAMILY MEDICINE

## 2019-08-22 PROCEDURE — 99214 PR OFFICE/OUTPT VISIT, EST, LEVL IV, 30-39 MIN: ICD-10-PCS | Mod: S$GLB,,, | Performed by: FAMILY MEDICINE

## 2019-08-22 PROCEDURE — 3008F BODY MASS INDEX DOCD: CPT | Mod: CPTII,S$GLB,, | Performed by: FAMILY MEDICINE

## 2019-08-22 RX ORDER — AMOXICILLIN 500 MG/1
500 TABLET, FILM COATED ORAL EVERY 12 HOURS
Qty: 20 TABLET | Refills: 0 | Status: SHIPPED | OUTPATIENT
Start: 2019-08-22 | End: 2019-09-01

## 2019-08-22 RX ORDER — FLUCONAZOLE 150 MG/1
150 TABLET ORAL DAILY
Qty: 1 TABLET | Refills: 0 | Status: SHIPPED | OUTPATIENT
Start: 2019-08-22 | End: 2019-08-23

## 2019-08-22 RX ORDER — NAPROXEN 500 MG/1
500 TABLET ORAL 2 TIMES DAILY PRN
Qty: 30 TABLET | Refills: 0 | Status: SHIPPED | OUTPATIENT
Start: 2019-08-22

## 2019-08-22 NOTE — PROGRESS NOTES
Health Maintenance Due   Topic     Pap Smear  [facility]; pt will send records once done.

## 2019-08-22 NOTE — PROGRESS NOTES
Subjective:       Patient ID: Rose Mayorga is a 34 y.o. female.    Chief Complaint: Sinus Problem (body ache, sore throat, and headache started this morning )    HPI    Upper Respiratory Symptoms:    Onset # of days:1    Presence of:  Cough:Yes  SOB:No  Wheeze:No  Rhinorrhea:No  PND:No  Nasal Congestion:No  Sore throat  Fever:No  Chills: No  Nausea:No  Vomiting:No   Diarrhea:No  HA: Yes  Myalgias: Yes  Sxs intermittent?: YES    Sick Contacts?:Yes    Treatments Tried: NSAID    Relief?:Yes    Current Outpatient Medications on File Prior to Visit   Medication Sig Dispense Refill    ibuprofen (ADVIL,MOTRIN) 600 MG tablet Take 1 tablet (600 mg total) by mouth 3 (three) times daily. 40 tablet 1    levonorgestrel (MIRENA) 20 mcg/24 hr (5 years) IUD 1 each by Intrauterine route once.       No current facility-administered medications on file prior to visit.        Past Medical History:   Diagnosis Date    History of cervical dysplasia     PCOS (polycystic ovarian syndrome)     Pre-diabetes        Family History   Problem Relation Age of Onset    No Known Problems Mother     No Known Problems Father     Cancer Maternal Grandmother         breast    No Known Problems Maternal Grandfather     No Known Problems Paternal Grandmother     No Known Problems Paternal Grandfather     Breast cancer Neg Hx     Colon cancer Neg Hx     Ovarian cancer Neg Hx         reports that she has never smoked. She has never used smokeless tobacco. She reports that she drinks alcohol. She reports that she does not use drugs.    Review of Systems see hpi   Objective:     Vitals:    08/22/19 1035   BP: 100/62   Pulse: 98   Resp: 16   Temp: 99.6 °F (37.6 °C)        Physical Exam   Constitutional: She appears well-developed. No distress.   HENT:   Head: Normocephalic and atraumatic.   Right Ear: Tympanic membrane normal. Tympanic membrane is not injected. No middle ear effusion.   Left Ear: Tympanic membrane normal. Tympanic membrane  is not injected.  No middle ear effusion.   Mouth/Throat: Mucous membranes are normal. Oropharyngeal exudate and posterior oropharyngeal erythema present. No posterior oropharyngeal edema. Tonsils are 3+ on the right. Tonsils are 3+ on the left. Tonsillar exudate.   Eyes: Conjunctivae are normal. No scleral icterus.   Pulmonary/Chest: Effort normal.   Lymphadenopathy:     She has cervical adenopathy.   Neurological: She is alert.   Skin: She is not diaphoretic.   Psychiatric: She has a normal mood and affect. Her behavior is normal.   Vitals reviewed.      Assessment:       1. Strep throat        Plan:       Rose was seen today for sinus problem.    Diagnoses and all orders for this visit:    Strep throat  -     POCT rapid strep A  -     amoxicillin (AMOXIL) 500 MG Tab; Take 1 tablet (500 mg total) by mouth every 12 (twelve) hours. for 10 days  -     fluconazole (DIFLUCAN) 150 MG Tab; Take 1 tablet (150 mg total) by mouth once daily. for 1 day  New dx - + rapid strep -  pt educated on disease etiology, prognosis and treatment. Answered pts questions.    Other orders  -     naproxen (NAPROSYN) 500 MG tablet; Take 1 tablet (500 mg total) by mouth 2 (two) times daily as needed (pain).            Follow up if symptoms worsen or fail to improve.        Pt verbalized understanding and agreed with our plan.

## 2019-08-22 NOTE — LETTER
August 22, 2019    Rose Mayorga  [address]  [facility] [address]             [facility]  [address] [facility] [address]  Phone: [phone]  Fax: [phone] To Whom It May Concern,      Rose Mayorga    Was treated here on 08/22/2019    May Return to work/school on 8/26/2019    No Restrictions    Please feel free to contact my office if you have any questions or concerns.             Dean Carrillo MD

## 2019-10-07 ENCOUNTER — OFFICE VISIT (OUTPATIENT)
Dept: FAMILY MEDICINE | Facility: CLINIC | Age: 34
End: 2019-10-07
Payer: COMMERCIAL

## 2019-10-07 VITALS
RESPIRATION RATE: 16 BRPM | TEMPERATURE: 99 F | HEIGHT: 67 IN | WEIGHT: 207.44 LBS | BODY MASS INDEX: 32.56 KG/M2 | OXYGEN SATURATION: 98 % | DIASTOLIC BLOOD PRESSURE: 74 MMHG | HEART RATE: 50 BPM | SYSTOLIC BLOOD PRESSURE: 110 MMHG

## 2019-10-07 DIAGNOSIS — J03.01 RECURRENT STREPTOCOCCAL TONSILLITIS: ICD-10-CM

## 2019-10-07 DIAGNOSIS — R05.8 RECURRENT DRY COUGH: Primary | ICD-10-CM

## 2019-10-07 PROCEDURE — 3008F BODY MASS INDEX DOCD: CPT | Mod: CPTII,S$GLB,, | Performed by: PHYSICIAN ASSISTANT

## 2019-10-07 PROCEDURE — 99999 PR PBB SHADOW E&M-EST. PATIENT-LVL IV: ICD-10-PCS | Mod: PBBFAC,,, | Performed by: PHYSICIAN ASSISTANT

## 2019-10-07 PROCEDURE — 99999 PR PBB SHADOW E&M-EST. PATIENT-LVL IV: CPT | Mod: PBBFAC,,, | Performed by: PHYSICIAN ASSISTANT

## 2019-10-07 PROCEDURE — 3008F PR BODY MASS INDEX (BMI) DOCUMENTED: ICD-10-PCS | Mod: CPTII,S$GLB,, | Performed by: PHYSICIAN ASSISTANT

## 2019-10-07 PROCEDURE — 99213 OFFICE O/P EST LOW 20 MIN: CPT | Mod: S$GLB,,, | Performed by: PHYSICIAN ASSISTANT

## 2019-10-07 PROCEDURE — 99213 PR OFFICE/OUTPT VISIT, EST, LEVL III, 20-29 MIN: ICD-10-PCS | Mod: S$GLB,,, | Performed by: PHYSICIAN ASSISTANT

## 2019-10-07 RX ORDER — BENZONATATE 100 MG/1
100 CAPSULE ORAL 3 TIMES DAILY PRN
Qty: 30 CAPSULE | Refills: 0 | Status: SHIPPED | OUTPATIENT
Start: 2019-10-07 | End: 2019-10-17

## 2019-10-07 RX ORDER — DESLORATADINE 5 MG/1
5 TABLET ORAL DAILY
Qty: 30 TABLET | Refills: 0 | Status: SHIPPED | OUTPATIENT
Start: 2019-10-07 | End: 2020-10-06

## 2019-10-07 NOTE — PROGRESS NOTES
Subjective:       Patient ID: Rose Mayorga is a 34 y.o. female.    Chief Complaint: Cough    Cough   This is a recurrent problem. The current episode started 1 to 4 weeks ago. The problem has been gradually improving. The problem occurs every few minutes. The cough is non-productive. Pertinent negatives include no chest pain, chills, ear congestion, ear pain, fever, headaches, heartburn, hemoptysis, myalgias, nasal congestion, postnasal drip, rash, rhinorrhea, sore throat, shortness of breath, sweats, weight loss or wheezing. The symptoms are aggravated by other. She has tried OTC cough suppressant for the symptoms. The treatment provided moderate relief. There is no history of asthma, bronchiectasis, bronchitis, COPD, emphysema, environmental allergies or pneumonia.     Review of Systems   Constitutional: Negative for chills, fever and weight loss.   HENT: Negative for ear pain, postnasal drip, rhinorrhea and sore throat.    Respiratory: Positive for cough. Negative for hemoptysis, shortness of breath and wheezing.    Cardiovascular: Negative for chest pain.   Gastrointestinal: Negative for heartburn.   Musculoskeletal: Negative for myalgias.   Skin: Negative for rash.   Allergic/Immunologic: Negative for environmental allergies.   Neurological: Negative for headaches.       Objective:      Physical Exam   Constitutional: She is oriented to person, place, and time. She appears well-developed and well-nourished.   HENT:   Head: Normocephalic and atraumatic.   Right Ear: Tympanic membrane normal.   Left Ear: Tympanic membrane normal.   Mouth/Throat: No oropharyngeal exudate, posterior oropharyngeal edema or posterior oropharyngeal erythema. Tonsils are 3+ on the right. Tonsils are 3+ on the left.   Cardiovascular: Normal rate and regular rhythm.   Pulmonary/Chest: Effort normal and breath sounds normal. No stridor. No respiratory distress.   Neurological: She is alert and oriented to person, place, and time.    Vitals reviewed.      Assessment:       1. Recurrent dry cough    2. Recurrent streptococcal tonsillitis        Plan:         Rose was seen today for cough.    Diagnoses and all orders for this visit:    Recurrent dry cough  -     benzonatate (TESSALON) 100 MG capsule; Take 1 capsule (100 mg total) by mouth 3 (three) times daily as needed for Cough.  -     desloratadine (CLARINEX) 5 mg tablet; Take 1 tablet (5 mg total) by mouth once daily.  -     Call if no relief    Recurrent streptococcal tonsillitis  -     Ambulatory referral to ENT  -     Will refer

## 2019-10-24 ENCOUNTER — OFFICE VISIT (OUTPATIENT)
Dept: OTOLARYNGOLOGY | Facility: CLINIC | Age: 34
End: 2019-10-24
Payer: COMMERCIAL

## 2019-10-24 VITALS
DIASTOLIC BLOOD PRESSURE: 72 MMHG | HEIGHT: 67 IN | SYSTOLIC BLOOD PRESSURE: 114 MMHG | WEIGHT: 209.44 LBS | BODY MASS INDEX: 32.87 KG/M2

## 2019-10-24 DIAGNOSIS — J35.1 LARGE TONSILS: ICD-10-CM

## 2019-10-24 DIAGNOSIS — J02.9 PHARYNGITIS, UNSPECIFIED ETIOLOGY: ICD-10-CM

## 2019-10-24 DIAGNOSIS — K21.9 GASTROESOPHAGEAL REFLUX DISEASE, ESOPHAGITIS PRESENCE NOT SPECIFIED: ICD-10-CM

## 2019-10-24 DIAGNOSIS — R05.9 COUGH: Primary | ICD-10-CM

## 2019-10-24 DIAGNOSIS — J03.01 RECURRENT STREPTOCOCCAL TONSILLITIS: ICD-10-CM

## 2019-10-24 PROCEDURE — 99203 PR OFFICE/OUTPT VISIT, NEW, LEVL III, 30-44 MIN: ICD-10-PCS | Mod: 25,S$GLB,, | Performed by: OTOLARYNGOLOGY

## 2019-10-24 PROCEDURE — 31575 LARYNGOSCOPY: ICD-10-PCS | Mod: S$GLB,,, | Performed by: OTOLARYNGOLOGY

## 2019-10-24 PROCEDURE — 99203 OFFICE O/P NEW LOW 30 MIN: CPT | Mod: 25,S$GLB,, | Performed by: OTOLARYNGOLOGY

## 2019-10-24 PROCEDURE — 31575 DIAGNOSTIC LARYNGOSCOPY: CPT | Mod: S$GLB,,, | Performed by: OTOLARYNGOLOGY

## 2019-10-24 PROCEDURE — 3008F BODY MASS INDEX DOCD: CPT | Mod: CPTII,S$GLB,, | Performed by: OTOLARYNGOLOGY

## 2019-10-24 PROCEDURE — 3008F PR BODY MASS INDEX (BMI) DOCUMENTED: ICD-10-PCS | Mod: CPTII,S$GLB,, | Performed by: OTOLARYNGOLOGY

## 2019-10-24 NOTE — LETTER
November 12, 2019      Lidia Lock PA-C  [address]  [place] [address]           [facility]  [address]  [place] [address]  Phone: [phone]          Patient: Rose Mayorga   MR Number: [id number]   YOB: 1985   Date of Visit: 10/24/2019       Dear Lidia Lock:    Thank you for referring Rose Mayorga to me for evaluation. Attached you will find relevant portions of my assessment and plan of care.    If you have questions, please do not hesitate to call me. I look forward to following Rose Mayorga along with you.    Sincerely,    Almas Tejada MD    Enclosure  CC:  No Recipients    If you would like to receive this communication electronically, please contact [email or web address] or [phone] to request more information on [employer] Link access.    For providers and/or their staff who would like to refer a patient to [facility], please contact us through our one-stop-shop provider referral line, [facility] , at [phone].    If you feel you have received this communication in error or would no longer like to receive these types of communications, please e-mail [email or web address]

## 2019-11-13 NOTE — PROGRESS NOTES
Subjective:      Rose Mayorga is a 34 y.o. female who was referred to me by Lidia Lock in consultation for Cough and recurrent tonsillitis.  The patient reports a chief concern of large tonsils with recurrent tonsillitis which has occurred for years.  She also has a month or more duration of cough.  She experiences episodes of recurrent tonsillitis a few times per year which result in treatment with antibiotics, having had 2 episodes of tonsillitis this year which were treated with antibiotics as well as a rapid strep a screen which was positive in February 2019.  A previous strep screen performed in March of 2018 had been negative.  She does not have a history of peritonsillar abscess or complications of tonsillitis and previous episodes have responded to antibiotic therapy.  She has no respiratory distress and does not have a a diagnosis of obstructive sleep apnea or sleep disordered breathing.  She notes that she does have large tonsils bilaterally which have not changed significantly in size. The she also has cough which is nonproductive and occurs frequently throughout the day without identifiable aggravating factors.  She also endorses some congestion and postnasal drip.  She has used some over-the-counter medications such as cough suppressant which provided some reduction in her cough.  Benzonatate cough suppressant prescribed earlier this month for her cough also provided some benefit.  No other alleviating factors identified. She does not endorse a history of asthma or known history of significant gastroesophageal reflux disease. She does not have significant environmental or seasonal affects on her symptoms of cough, congestion, or postnasal drip and has not been diagnosed with allergies though does find perhaps some benefit with oral antihistamine medication.  She denies pneumonia, significant shortness of breath, noisy breathing including wheezing, significant facial pressure pain, unusually  discolored nasal drainage, sneezing, itchy watery eyes, significant sinus pressure, fevers, chills, significant changes in weight, or any nausea or vomiting.    Past Medical History  She has a past medical history of History of cervical dysplasia, PCOS (polycystic ovarian syndrome), and Pre-diabetes.    Past Surgical History  She has a past surgical history that includes Cryotherapy.    Family History  Her family history includes Cancer in her maternal grandmother; No Known Problems in her father, maternal grandfather, mother, paternal grandfather, and paternal grandmother.    Social History  She reports that she has never smoked. She has never used smokeless tobacco. She reports that she drinks alcohol. She reports that she does not use drugs.    Allergies  She has No Known Allergies.    Medications   She has a current medication list which includes the following prescription(s): desloratadine, ibuprofen, levonorgestrel, naproxen, ranitidine, and triamcinolone acetonide 0.1%.    Review of Systems  Review of Systems   Constitutional: Negative.  Negative for activity change, chills, fatigue, fever and unexpected weight change.   HENT: Positive for congestion and postnasal drip. Negative for ear discharge, ear pain, facial swelling, nosebleeds, rhinorrhea, sinus pressure, sinus pain, sneezing, trouble swallowing and voice change.    Eyes: Negative.  Negative for pain, discharge and itching.   Respiratory: Positive for cough.    Cardiovascular: Negative.    Gastrointestinal: Negative.  Negative for abdominal pain, nausea and vomiting.   Endocrine: Negative.    Genitourinary: Negative.    Musculoskeletal: Negative.  Negative for arthralgias, myalgias, neck pain and neck stiffness.   Skin: Negative.  Negative for rash.   Allergic/Immunologic: Negative.  Negative for environmental allergies, food allergies and immunocompromised state.   Neurological: Negative.  Negative for dizziness, weakness, numbness and headaches.  "  Psychiatric/Behavioral: Negative.           Objective:     /72 (BP Location: Right arm, Patient Position: Sitting)   Ht 5' 7" (1.702 m)   Wt 95 kg (209 lb 7 oz)   BMI 32.80 kg/m²        Constitutional:   Vital signs are normal. She appears well-developed and well-nourished. No distress. Normal speech.      Head:  Normocephalic and atraumatic. No skin lesions. Salivary glands normal.  Facial strength is normal.      Ears:  Hearing normal to normal and whispered voice; external ear normal without scars, lesions, or masses; ear canal, tympanic membrane, and middle ear normal..   Right Ear: No drainage. Tympanic membrane is not perforated, not retracted and not bulging. No middle ear effusion.   Left Ear: No drainage. Tympanic membrane is not perforated, not retracted and not bulging.  No middle ear effusion.     Nose:  Nose normal including turbinates, nasal mucosa, sinuses and nasal septum.     Mouth/Throat  Normal uvula midline and lips, teeth, and gums normal. Normal dentition. No uvula swelling, oral lesions or mucous membrane lesions. Posterior oropharyngeal edema present. No oropharyngeal exudate or tonsillar abscesses. Tonsils present, +3.  Tonsillar erythema, tonsillar hyperemia, tonsillar exudate.      Neck:  Neck normal without thyromegaly masses, asymmetry, normal tracheal structure, crepitus, and tenderness, thyroid normal, trachea normal, phonation normal, full range of motion with neck supple and no adenopathy. No stridor present.     Pulmonary/Chest:   Effort normal. No stridor.     Psychiatric:   She has a normal mood and affect. Her speech is normal and behavior is normal.     Neurological:   She has neurological normal, alert and oriented.     Skin:   No abrasions, lacerations, lesions, or rashes.       Procedure    Flexible laryngoscopy performed.  See procedure note.    Data Reviewed    No results found for: WBC  No results found for: EOSINOPHIL  No results found for: EOS  No results " found for: PLT  No results found for: GLU  No results found for: IGE    Laboratory studies reviewed:  Rapid Strep A Screen 2/19/19: Positive     Evaluations and treatment related to strep tonsillitis over the past year were reviewed in the medical record.  Diagnoses and related treatments from August and February 2019 which were reviewed are copied below for reference:    August 22, 2019:  Strep throat  -     POCT rapid strep A  -     amoxicillin (AMOXIL) 500 MG Tab; Take 1 tablet (500 mg total) by mouth every 12 (twelve) hours. for 10 days  -     fluconazole (DIFLUCAN) 150 MG Tab; Take 1 tablet (150 mg total) by mouth once daily. for 1 day  New dx - + rapid strep -  pt educated on disease etiology, prognosis and treatment. Answered pts questions.    February 19, 2019:  Streptococcal pharyngitis  -     penicillin G benzathine (BICILLIN LA) injection 1.2 Million Units  -     ibuprofen (ADVIL,MOTRIN) 600 MG tablet; Take 1 tablet (600 mg total) by mouth 3 (three) times daily.  Dispense: 40 tablet; Refill: 1  -     Will treat at this time.      Assessment:     1. Cough    2. Gastroesophageal reflux disease, esophagitis presence not specified    3. Large tonsils    4. Pharyngitis, unspecified etiology    5. Recurrent streptococcal tonsillitis         Plan:     I had a long discussion with the patient regarding her condition and the further workup and management options.  The patient's symptoms related to chief complaints of large tonsils with episodes of recurrent tonsillitis as well as her cough were discussed, findings from examination were reviewed, and all of patient's questions were answered.  I reviewed treatment guidelines related to tonsillectomy and indications for tonsillectomy an adult patients such as herself.  Her episodes of tonsillitis occurring a few times a year, twice this year, do not meet general recommendations and guidelines for tonsillectomy and this was explained to the patient.  There is no  history of obstructive sleep apnea indicate tonsillectomy for sleep disordered breathing and upper airway obstruction.  The patient has findings suggestive of laryngopharyngeal reflux and gastroesophageal reflux disease which may be contributing to her symptoms of cough and may also contribute to postnasal drip and pharyngitis.  I have advised we initiate medical therapy for reflux -prescription for H2 blocker provided- as well as begin lifestyle and diet modifications which were described and aimed at reducing gastroesophageal reflux.  Discussed options for escalating management of reflux depending on her response to these initial interventions.    Follow-up after trial of medical treatment, lifestyle modification, and dietary modification aimed at reducing gastroesophageal reflux disease and related laryngopharyngeal reflux.

## 2019-11-13 NOTE — PROCEDURES
Laryngoscopy  Date/Time: 10/24/2019 2:00 PM  Performed by: Almas Tejada MD  Authorized by: Almas Tejada MD     Consent Done?:  Yes (Verbal)  Anesthesia:     Local anesthetic:  4% Xylocaine spray with Steven-Synephrine    Patient tolerance:  Patient tolerated the procedure well with no immediate complications    Decongestion performed?: Yes    Laryngoscopy:     Areas examined:  Nasopharynx, oropharynx, hypopharynx, larynx, vocal cords and nasal cavities    Laryngoscope size:  3 mm  Nose External:      No external nasal deformity  Nose Intranasal:      Mucosa no polyps     Mucosa ulcers not present     No mucosa lesions present  Nasopharynx:      No mucosa lesions     Adenoids not present     Posterior choanae patent     Eustachian tube patent  Larynx/hypopharynx:      No epiglottis lesions     No epiglottis edema     No AE folds lesions     No vocal cord polyps     Equal and normal bilateral     No hypopharynx lesions     No piriform sinus pooling     No piriform sinus lesions     Post cricoid edema     Post cricoid erythema     After appropriate consent, a fiberoptic laryngoscope was introduced into the left side of the nose.The nasopharynx was clear without concerning findings. The scope was flexed to examine the eustachian tube orifice which was patent ipsilaterally as well as contralaterally. Further examination revealed the base of tongue, vallecula, piriform sinuses, laryngeal and lingual surfaces of the epiglottis, arytenoids and interarytenoid area, posterior pharyngeal wall, true vocal cords, false vocal cords, and ventricle as well as the upper trachea. The patient was asked to vocalize to examine the adduction and symmetry of vocal fold mobility and closure, then to sniff alowing examination of vocal fold abduction. Tongue protrusion allowed for complete evaluation of the valleculae. The exam was well tolerated. Findings were explained to the patient.    Summary of findings: There were findings of  mucosal edema and erythema greatest in the post-cricoid area and abram-laryngeal distribution that are suggestive of laryngopharyngeal reflux (LPR) related to uncontrolled GERD.  Large palatine tonsils are seen on examination as well as on flexible laryngoscopy but are symmetric and without evidence of acute infection.  The examination was otherwise normal without concerning findings.

## 2019-12-06 ENCOUNTER — OFFICE VISIT (OUTPATIENT)
Dept: FAMILY MEDICINE | Facility: CLINIC | Age: 34
End: 2019-12-06
Payer: COMMERCIAL

## 2019-12-06 VITALS
WEIGHT: 211.19 LBS | OXYGEN SATURATION: 99 % | SYSTOLIC BLOOD PRESSURE: 106 MMHG | BODY MASS INDEX: 33.15 KG/M2 | RESPIRATION RATE: 16 BRPM | HEIGHT: 67 IN | HEART RATE: 87 BPM | DIASTOLIC BLOOD PRESSURE: 64 MMHG | TEMPERATURE: 99 F

## 2019-12-06 DIAGNOSIS — J35.1 ENLARGED TONSILS: ICD-10-CM

## 2019-12-06 DIAGNOSIS — J06.9 VIRAL URI WITH COUGH: Primary | ICD-10-CM

## 2019-12-06 LAB
CTP QC/QA: YES
S PYO RRNA THROAT QL PROBE: NEGATIVE

## 2019-12-06 PROCEDURE — 3008F BODY MASS INDEX DOCD: CPT | Mod: CPTII,S$GLB,, | Performed by: NURSE PRACTITIONER

## 2019-12-06 PROCEDURE — 99214 PR OFFICE/OUTPT VISIT, EST, LEVL IV, 30-39 MIN: ICD-10-PCS | Mod: 25,S$GLB,, | Performed by: NURSE PRACTITIONER

## 2019-12-06 PROCEDURE — 99214 OFFICE O/P EST MOD 30 MIN: CPT | Mod: 25,S$GLB,, | Performed by: NURSE PRACTITIONER

## 2019-12-06 PROCEDURE — 99999 PR PBB SHADOW E&M-EST. PATIENT-LVL III: ICD-10-PCS | Mod: PBBFAC,,, | Performed by: NURSE PRACTITIONER

## 2019-12-06 PROCEDURE — 87880 STREP A ASSAY W/OPTIC: CPT | Mod: QW,S$GLB,, | Performed by: NURSE PRACTITIONER

## 2019-12-06 PROCEDURE — 99999 PR PBB SHADOW E&M-EST. PATIENT-LVL III: CPT | Mod: PBBFAC,,, | Performed by: NURSE PRACTITIONER

## 2019-12-06 PROCEDURE — 3008F PR BODY MASS INDEX (BMI) DOCUMENTED: ICD-10-PCS | Mod: CPTII,S$GLB,, | Performed by: NURSE PRACTITIONER

## 2019-12-06 PROCEDURE — 87880 POCT RAPID STREP A: ICD-10-PCS | Mod: QW,S$GLB,, | Performed by: NURSE PRACTITIONER

## 2019-12-06 RX ORDER — BENZONATATE 100 MG/1
100 CAPSULE ORAL 3 TIMES DAILY PRN
Qty: 30 CAPSULE | Refills: 0 | Status: SHIPPED | OUTPATIENT
Start: 2019-12-06 | End: 2020-05-04

## 2019-12-06 RX ORDER — FLUTICASONE PROPIONATE 50 MCG
1 SPRAY, SUSPENSION (ML) NASAL DAILY
Qty: 15.8 ML | Refills: 0 | Status: SHIPPED | OUTPATIENT
Start: 2019-12-06 | End: 2022-01-18

## 2019-12-06 RX ORDER — LEVOCETIRIZINE DIHYDROCHLORIDE 5 MG/1
5 TABLET, FILM COATED ORAL NIGHTLY
Qty: 30 TABLET | Refills: 11 | Status: SHIPPED | OUTPATIENT
Start: 2019-12-06 | End: 2020-12-05

## 2019-12-06 NOTE — PROGRESS NOTES
Routine Office Visit    Patient Name: Rose Mayorga    : 1985  MRN: [id number]    Chief Complaint:  URI    Subjective:  Rose is a 34 y.o. female who presents today for:    1.  URI - patient reports today for evaluation of upper respiratory symptoms of sore throat, postnasal drip, nasal congestion, and sneezing.  Endorses a productive cough as well but denies any shortness of breath, wheezing, chest pain, or palpitations.  Symptoms have been going on for the last 2 days she has been using hot tea as well as Mucinex both of which have not provided significant relief.  Denies any recent sick contacts.  She has not gotten the flu shot yet this year.  Of note, she is being followed by any ENT for enlarged tonsils and frequent sore throat.  Had a flexible laryngeal scope done at the last appointment which showed enlarged tonsils as well as laryngeal pharyngeal reflux.  She is scheduled for follow-up appointment later this month with ENT.  Patient is new to me and this is the extent of her concerns at this time.    Past Medical History  Past Medical History:   Diagnosis Date    History of cervical dysplasia     PCOS (polycystic ovarian syndrome)     Pre-diabetes        Past Surgical History  Past Surgical History:   Procedure Laterality Date    CRYOTHERAPY         Family History  Family History   Problem Relation Age of Onset    No Known Problems Mother     No Known Problems Father     Cancer Maternal Grandmother         breast    No Known Problems Maternal Grandfather     No Known Problems Paternal Grandmother     No Known Problems Paternal Grandfather     Breast cancer Neg Hx     Colon cancer Neg Hx     Ovarian cancer Neg Hx        Social History  Social History     Socioeconomic History    Marital status: Single     Spouse name: Not on file    Number of children: Not on file    Years of education: Not on file    Highest education level: Not on file   Occupational History    Not on file    Social Needs    Financial resource strain: Not hard at all    Food insecurity:     Worry: Never true     Inability: Never true    Transportation needs:     Medical: No     Non-medical: No   Tobacco Use    Smoking status: Never Smoker    Smokeless tobacco: Never Used   Substance and Sexual Activity    Alcohol use: Yes     Frequency: Monthly or less     Drinks per session: 1 or 2     Binge frequency: Never     Comment: socially    Drug use: No    Sexual activity: Yes     Partners: Male     Birth control/protection: Condom   Lifestyle    Physical activity:     Days per week: 1 day     Minutes per session: 10 min    Stress: Only a little   Relationships    Social connections:     Talks on phone: More than three times a week     Gets together: More than three times a week     Attends [employer] service: Not on file     Active member of club or organization: No     Attends meetings of clubs or organizations: Never     Relationship status: Never    Other Topics Concern    Not on file   Social History Narrative    Not on file       Current Medications  Current Outpatient Medications on File Prior to Visit   Medication Sig Dispense Refill    desloratadine (CLARINEX) 5 mg tablet Take 1 tablet (5 mg total) by mouth once daily. 30 tablet 0    ibuprofen (ADVIL,MOTRIN) 600 MG tablet Take 1 tablet (600 mg total) by mouth 3 (three) times daily. 40 tablet 1    levonorgestrel (MIRENA) 20 mcg/24 hr (5 years) IUD 1 each by Intrauterine route once.      naproxen (NAPROSYN) 500 MG tablet Take 1 tablet (500 mg total) by mouth 2 (two) times daily as needed (pain). (Patient not taking: Reported on 10/7/2019) 30 tablet 0    ranitidine (ZANTAC) 150 MG tablet Take 1 tablet (150 mg total) by mouth nightly. 30 tablet 3    triamcinolone acetonide 0.1% (KENALOG) 0.1 % cream Apply topically 2 (two) times daily as needed. (Patient not taking: Reported on 10/7/2019) 15 g 0     No current facility-administered medications on  "file prior to visit.        Allergies   Review of patient's allergies indicates:  No Known Allergies    Review of Systems (Pertinent positives)  Review of Systems   Constitutional: Negative for chills, diaphoresis, fever, malaise/fatigue and weight loss.   HENT: Positive for congestion and sore throat. Negative for ear discharge, ear pain, hearing loss, nosebleeds, sinus pain and tinnitus.         + rhinorrhea, +postnasal drip   Eyes: Negative.    Respiratory: Positive for cough and sputum production. Negative for hemoptysis, shortness of breath, wheezing and stridor.    Cardiovascular: Negative for chest pain, palpitations, orthopnea, claudication, leg swelling and PND.   Gastrointestinal: Negative.    Genitourinary: Negative.    Musculoskeletal: Negative.    Neurological: Negative.    Endo/Heme/Allergies: Negative.    Psychiatric/Behavioral: Negative.        /64 (BP Location: Left arm, Patient Position: Sitting, BP Method: Large (Manual))   Pulse 87   Temp 98.7 °F (37.1 °C) (Oral)   Resp 16   Ht 5' 7" (1.702 m)   Wt 95.8 kg (211 lb 3.2 oz)   SpO2 99%   BMI 33.08 kg/m²     Physical Exam   Constitutional: She is oriented to person, place, and time. She appears well-developed and well-nourished.  Non-toxic appearance. She does not have a sickly appearance. She does not appear ill. No distress.   HENT:   Head: Normocephalic and atraumatic.   Right Ear: Tympanic membrane, external ear and ear canal normal.   Left Ear: Tympanic membrane, external ear and ear canal normal.   Nose: Mucosal edema and rhinorrhea present. Right sinus exhibits no maxillary sinus tenderness and no frontal sinus tenderness. Left sinus exhibits no maxillary sinus tenderness and no frontal sinus tenderness.   Mouth/Throat: Uvula is midline and mucous membranes are normal. No oropharyngeal exudate, posterior oropharyngeal edema, posterior oropharyngeal erythema or tonsillar abscesses. Tonsils are 1+ on the right. Tonsils are 1+ on the " left. No tonsillar exudate.       Eyes: Pupils are equal, round, and reactive to light. Conjunctivae and EOM are normal.   Neck: Normal range of motion. Neck supple.   Cardiovascular: Normal rate, regular rhythm, normal heart sounds and intact distal pulses. Exam reveals no gallop and no friction rub.   No murmur heard.  Pulmonary/Chest: Effort normal and breath sounds normal. No stridor. No respiratory distress. She has no wheezes. She has no rales. She exhibits no tenderness.   Abdominal: Soft. Bowel sounds are normal.   Neurological: She is alert and oriented to person, place, and time.   Skin: Skin is warm and dry. Capillary refill takes less than 2 seconds. She is not diaphoretic.   Vitals reviewed.       Assessment/Plan:  Rose Mayorga is a 34 y.o. female who presents today for :    Rose was seen today for cough, nasal congestion and headache.    Diagnoses and all orders for this visit:    Viral URI with cough  -     fluticasone propionate (FLONASE) 50 mcg/actuation nasal spray; 1 spray (50 mcg total) by Each Nostril route once daily.  -     levocetirizine (XYZAL) 5 MG tablet; Take 1 tablet (5 mg total) by mouth every evening.  -     benzonatate (TESSALON) 100 MG capsule; Take 1 capsule (100 mg total) by mouth 3 (three) times daily as needed for Cough.  -     POCT Rapid Strep A    Enlarged tonsils    Tonsils mildly enlarged, but rapid strep test is negative and no further evidence of bacterial infection at this time.  She has some cobblestoning noted with postnasal drip.  Will treat with daily Flonase and Xyzal and at Tessalon for coughing.  Encouraged patient to drink plenty of water.  Use warm salt water gargles and continue tea with honey as needed for sore throat size postnasal drip.  I encouraged the patient to maintain her follow-up with ENT later this month.  If symptoms do not improve with the above therapies patient can contact the clinic or follow up in the meantime.  Patient verbalized  understanding of instructions.        This office note has been dictated.  This dictation has been generated using M-Modal Fluency Direct dictation; some phonetic errors may occur.   My collaborating physician is Dr. Rah Hardy.

## 2019-12-06 NOTE — PATIENT INSTRUCTIONS
Viral Upper Respiratory Illness (Adult)  You have a viral upper respiratory illness (URI), which is another term for the common cold. This illness is contagious during the first few days. It is spread through the air by coughing and sneezing. It may also be spread by direct contact (touching the sick person and then touching your own eyes, nose, or mouth). Frequent handwashing will decrease risk of spread. Most viral illnesses go away within 7 to 10 days with rest and simple home remedies. Sometimes the illness may last for several weeks. Antibiotics will not kill a virus, and they are generally not prescribed for this condition.    Home care  · If symptoms are severe, rest at home for the first 2 to 3 days. When you resume activity, don't let yourself get too tired.  · Avoid being exposed to cigarette smoke (yours or others).  · You may use acetaminophen or ibuprofen to control pain and fever, unless another medicine was prescribed. (Note: If you have chronic liver or kidney disease, have ever had a stomach ulcer or gastrointestinal bleeding, or are taking blood-thinning medicines, talk with your healthcare provider before using these medicines.) Aspirin should never be given to anyone under 18 years of age who is ill with a viral infection or fever. It may cause severe liver or brain damage.  · Your appetite may be poor, so a light diet is fine. Avoid dehydration by drinking 6 to 8 glasses of fluids per day (water, soft drinks, juices, tea, or soup). Extra fluids will help loosen secretions in the nose and lungs.  · Over-the-counter cold medicines will not shorten the length of time youre sick, but they may be helpful for the following symptoms: cough, sore throat, and nasal and sinus congestion. (Note: Do not use decongestants if you have high blood pressure.)  Follow-up care  Follow up with your healthcare provider, or as advised.  When to seek medical advice  Call your healthcare provider right away if any  of these occur:  · Cough with lots of colored sputum (mucus)  · Severe headache; face, neck, or ear pain  · Difficulty swallowing due to throat pain  · Fever of 100.4°F (38°C)  Call 911, or get immediate medical care  Call emergency services right away if any of these occur:  · Chest pain, shortness of breath, wheezing, or difficulty breathing  · Coughing up blood  · Inability to swallow due to throat pain  Date Last Reviewed: 9/13/2015  © 8657-9486 [employer]. [address] [place] [address]. All rights reserved. This information is not intended as a substitute for professional medical care. Always follow your healthcare professional's instructions.

## 2020-05-02 DIAGNOSIS — J06.9 VIRAL URI WITH COUGH: ICD-10-CM

## 2020-05-04 RX ORDER — BENZONATATE 100 MG/1
CAPSULE ORAL
Qty: 30 CAPSULE | Refills: 0 | Status: SHIPPED | OUTPATIENT
Start: 2020-05-04

## 2020-05-12 ENCOUNTER — TELEPHONE (OUTPATIENT)
Dept: OTOLARYNGOLOGY | Facility: CLINIC | Age: 35
End: 2020-05-12

## 2020-05-12 ENCOUNTER — PATIENT MESSAGE (OUTPATIENT)
Dept: OTOLARYNGOLOGY | Facility: CLINIC | Age: 35
End: 2020-05-12

## 2020-05-12 NOTE — TELEPHONE ENCOUNTER
Patient called me back and she wanted to schedule a Virtual Visit with Dr. Tamez for tomorrow, Scratchy Throat.  I explained to her that if Dr. Tamez wants her to follow up in office , she will then need a covid test 48 hours prior to appointment.

## 2020-05-12 NOTE — TELEPHONE ENCOUNTER
Called Patient and no answer, left message for her to call me regarding an appointment that was scheduled on Thursday for Throat Pain and was put on the schedule as of today.  Patient will need Covid testing.

## 2020-05-12 NOTE — TELEPHONE ENCOUNTER
----- Message from Irais Dang sent at 5/12/2020  4:14 PM CDT -----  Contact: pt  Name of Who is Calling: pt    What is the request in detail: is returning a call. Please contact to further discuss and advise      Can the clinic reply by [facility]: no    What [facility] [phone]

## 2020-05-13 ENCOUNTER — OFFICE VISIT (OUTPATIENT)
Dept: OTOLARYNGOLOGY | Facility: CLINIC | Age: 35
End: 2020-05-13
Payer: MEDICAID

## 2020-05-13 DIAGNOSIS — K21.9 GASTROESOPHAGEAL REFLUX DISEASE, ESOPHAGITIS PRESENCE NOT SPECIFIED: Primary | ICD-10-CM

## 2020-05-13 PROCEDURE — 99213 OFFICE O/P EST LOW 20 MIN: CPT | Mod: 95,,, | Performed by: OTOLARYNGOLOGY

## 2020-05-13 PROCEDURE — 99213 PR OFFICE/OUTPT VISIT, EST, LEVL III, 20-29 MIN: ICD-10-PCS | Mod: 95,,, | Performed by: OTOLARYNGOLOGY

## 2020-05-13 NOTE — PROGRESS NOTES
Otolaryngology Clinic Note- VIRTUAL VISIT  Date:  05/13/2020   Because of the COVID-19 pandemic, we offered the patient a virtual visit or to reschedule once regulations regarding elective office visits and procedures has been changed. She elected to have a virtual visit.The patient's virtual visit today was conducted via telemedicine (video visit) as indicated to reduce risk of transmission of Covid-19 given the current pandemic and safety measures related to this.      The location of the patient for the virtual visit is : car    Chief complaint:  Scratchy throat    History of Present Illness  Rose Mayorga is a 34 y.o. female  presenting today for a follow up of throat irritation. I have not seen the patient previously, however she has been seen by Dr. Tejada in October of 2019 for recurrent strep history. She did not meet criteria for tonsillectomy. She was also diagnosed with LPR at that time.     The patient reports symptoms started 2 day(s) ago and has been stable . Denies fever. Denies otalgia.  Denies difficulty with swallowing. Denies hoarseness. Occasionally feels the need to clear her throat. She take PPI at night randomly. She drinks tea but not coffee, does not eat chocolate, eats spicy food on occasion. She does get heartburn in addition to the extraesophageal reflux symptoms as described above.     She has a history of seasonal allergies but does not currently have rhinorrhea/postnasal drip, no nasal congestion. Denies itchy eyes/nose.         Past Medical History  Past Medical History:   Diagnosis Date    History of cervical dysplasia     PCOS (polycystic ovarian syndrome)     Pre-diabetes         Past Surgical History  Past Surgical History:   Procedure Laterality Date    CRYOTHERAPY          Medications  Current Outpatient Medications on File Prior to Visit   Medication Sig Dispense Refill    benzonatate (TESSALON) 100 MG capsule TAKE 1 CAPSULE(100 MG) BY MOUTH THREE TIMES DAILY AS NEEDED  FOR COUGH 30 capsule 0    desloratadine (CLARINEX) 5 mg tablet Take 1 tablet (5 mg total) by mouth once daily. 30 tablet 0    fluticasone propionate (FLONASE) 50 mcg/actuation nasal spray 1 spray (50 mcg total) by Each Nostril route once daily. 15.8 mL 0    ibuprofen (ADVIL,MOTRIN) 600 MG tablet Take 1 tablet (600 mg total) by mouth 3 (three) times daily. 40 tablet 1    levocetirizine (XYZAL) 5 MG tablet Take 1 tablet (5 mg total) by mouth every evening. 30 tablet 11    levonorgestrel (MIRENA) 20 mcg/24 hr (5 years) IUD 1 each by Intrauterine route once.      naproxen (NAPROSYN) 500 MG tablet Take 1 tablet (500 mg total) by mouth 2 (two) times daily as needed (pain). (Patient not taking: Reported on 10/7/2019) 30 tablet 0    ranitidine (ZANTAC) 150 MG tablet Take 1 tablet (150 mg total) by mouth nightly. 30 tablet 3    triamcinolone acetonide 0.1% (KENALOG) 0.1 % cream Apply topically 2 (two) times daily as needed. (Patient not taking: Reported on 10/7/2019) 15 g 0     No current facility-administered medications on file prior to visit.        Review of Systems  Review of Systems   Constitutional: Negative for fever.   HENT: Positive for sore throat. Negative for congestion.    Respiratory: Positive for cough (throat clearing).    Cardiovascular: Negative for chest pain.   Gastrointestinal: Positive for heartburn.   Skin: Negative for itching.   Neurological: Negative for headaches.   Endo/Heme/Allergies: Positive for environmental allergies.        Social History   reports that she has never smoked. She has never used smokeless tobacco. She reports that she drinks alcohol. She reports that she does not use drugs.     Family History  Family History   Problem Relation Age of Onset    Cancer Maternal Grandmother         breast        Physical Exam   There were no vitals filed for this visit. There is no height or weight on file to calculate BMI.    unable to perform vitals on virtual visit        GENERAL:  alert, no acute distress.  HEAD: normocephalic.  SKIN: no lesions of skin of head and neck area.  EYES: lids and lashes normal. No proptosis  EARS: external ear without lesion  NOSE: external nose without abnormality  ORAL CAVITY/OROPHARYNX: lips normal  NECK: trachea midline.   RESPIRATORY: no stridor, no stertor. Voice normal. Respirations nonlabored.  NEURO: alert, responds to questions appropriately.    Facial movement symmetric with good eye closure and symmetric smile.   PSYCH:mood appropriate      Imaging:  The patient does not have any pertinent and/or recent imaging of the head and neck.     Labs:    Assessment  1. Gastroesophageal reflux disease, esophagitis presence not specified       Plan:  Discussed plan of care with patient in detail and all questions answered. Patient reported understanding of plan of care.   Advised pt to do PPI BID ( once immediately upon awakening and once 30 minutes before dinner). Reviewed behavioral modifications for reflux as well. Will have patient f/u in a couple of weeks for scope exam if needed and to see how she is doing with the bid ppi. Will do this for a short time period (2-3 months) and then try to wean back down. May also need GI referral in the future. Will hold for now to see response to PPI    Advised pt to call office if she has worsening pain, ear pain, difficulty swallowing, fever . She does have a history of recurrent strep tonsillitis therefore it is possible this is the start of an infection .     Follow up 2-3 weeks

## 2020-05-14 ENCOUNTER — TELEPHONE (OUTPATIENT)
Dept: OTOLARYNGOLOGY | Facility: CLINIC | Age: 35
End: 2020-05-14

## 2020-05-14 NOTE — TELEPHONE ENCOUNTER
----- Message from Shari Tamez MD sent at 5/13/2020  1:34 PM CDT -----  Please schedule appointment for next available opening either last week of may /first week of June. She will need a scope exam at visit so if we are still doing covid testing will need that. Thanks!

## 2020-05-14 NOTE — TELEPHONE ENCOUNTER
Patient called me back and I scheduled her for a follow up to see Dr. Tamez on [facility] [address].  I also informed her that if Dr. Tamez orders a Covid Test I will notify her in advance.

## 2020-05-14 NOTE — TELEPHONE ENCOUNTER
Called Patient and no answer, left voicemail for her to call me back regarding scheduling her an appt to see Dr. Tamez the first wk in June from a follow up Virtual Visit appt.

## 2020-05-27 ENCOUNTER — PATIENT MESSAGE (OUTPATIENT)
Dept: OTOLARYNGOLOGY | Facility: CLINIC | Age: 35
End: 2020-05-27

## 2020-05-27 DIAGNOSIS — R07.0 THROAT PAIN: Primary | ICD-10-CM

## 2020-05-28 ENCOUNTER — TELEPHONE (OUTPATIENT)
Dept: OTOLARYNGOLOGY | Facility: CLINIC | Age: 35
End: 2020-05-28

## 2020-05-28 ENCOUNTER — PATIENT MESSAGE (OUTPATIENT)
Dept: OTOLARYNGOLOGY | Facility: CLINIC | Age: 35
End: 2020-05-28

## 2020-06-01 ENCOUNTER — TELEPHONE (OUTPATIENT)
Dept: OTOLARYNGOLOGY | Facility: CLINIC | Age: 35
End: 2020-06-01

## 2020-06-01 NOTE — TELEPHONE ENCOUNTER
Patient has an appt scheduled with our office for 6/3/2020 for throat pain. Patient needs Pre-screen covid test 48hrs prior to appt. Left voicemail on patients phone to call the office. several calls have been place including a portal message for patient to contact our office. No response as of today.

## 2020-06-12 ENCOUNTER — LAB VISIT (OUTPATIENT)
Dept: FAMILY MEDICINE | Facility: CLINIC | Age: 35
End: 2020-06-12
Payer: MEDICAID

## 2020-06-12 DIAGNOSIS — R07.0 THROAT PAIN: ICD-10-CM

## 2020-06-12 PROCEDURE — U0003 INFECTIOUS AGENT DETECTION BY NUCLEIC ACID (DNA OR RNA); SEVERE ACUTE RESPIRATORY SYNDROME CORONAVIRUS 2 (SARS-COV-2) (CORONAVIRUS DISEASE [COVID-19]), AMPLIFIED PROBE TECHNIQUE, MAKING USE OF HIGH THROUGHPUT TECHNOLOGIES AS DESCRIBED BY CMS-2020-01-R: HCPCS

## 2020-06-15 LAB — SARS-COV-2 RNA RESP QL NAA+PROBE: NOT DETECTED

## 2020-06-16 ENCOUNTER — OFFICE VISIT (OUTPATIENT)
Dept: OTOLARYNGOLOGY | Facility: CLINIC | Age: 35
End: 2020-06-16
Payer: MEDICAID

## 2020-06-16 VITALS
WEIGHT: 211.44 LBS | TEMPERATURE: 97 F | SYSTOLIC BLOOD PRESSURE: 118 MMHG | DIASTOLIC BLOOD PRESSURE: 78 MMHG | BODY MASS INDEX: 33.19 KG/M2 | HEIGHT: 67 IN

## 2020-06-16 DIAGNOSIS — H61.21 IMPACTED CERUMEN OF RIGHT EAR: ICD-10-CM

## 2020-06-16 DIAGNOSIS — K21.9 LARYNGOPHARYNGEAL REFLUX (LPR): Primary | ICD-10-CM

## 2020-06-16 PROCEDURE — 31575 PR LARYNGOSCOPY, FLEXIBLE; DIAGNOSTIC: ICD-10-PCS | Mod: S$GLB,,, | Performed by: OTOLARYNGOLOGY

## 2020-06-16 PROCEDURE — 69210 PR REMOVAL IMPACTED CERUMEN REQUIRING INSTRUMENTATION, UNILATERAL: ICD-10-PCS | Mod: 51,S$GLB,, | Performed by: OTOLARYNGOLOGY

## 2020-06-16 PROCEDURE — 99214 OFFICE O/P EST MOD 30 MIN: CPT | Mod: 25,S$GLB,, | Performed by: OTOLARYNGOLOGY

## 2020-06-16 PROCEDURE — 69210 REMOVE IMPACTED EAR WAX UNI: CPT | Mod: 51,S$GLB,, | Performed by: OTOLARYNGOLOGY

## 2020-06-16 PROCEDURE — 31575 DIAGNOSTIC LARYNGOSCOPY: CPT | Mod: S$GLB,,, | Performed by: OTOLARYNGOLOGY

## 2020-06-16 PROCEDURE — 99214 PR OFFICE/OUTPT VISIT, EST, LEVL IV, 30-39 MIN: ICD-10-PCS | Mod: 25,S$GLB,, | Performed by: OTOLARYNGOLOGY

## 2020-06-16 RX ORDER — PANTOPRAZOLE SODIUM 20 MG/1
40 TABLET, DELAYED RELEASE ORAL
Qty: 120 TABLET | Refills: 11 | Status: SHIPPED | OUTPATIENT
Start: 2020-06-16 | End: 2021-06-16

## 2020-06-16 NOTE — PATIENT INSTRUCTIONS
Information and instructions from your visit with me today:  Ear Wax Build Up (Cerumen Impaction):    Safe methods to reduce ear wax/cerumen impaction at home include occasional use of over-the-counter ear wax removal drops like Debrox.    Avoid use of q tips or placing anything down the ear canals as this impacts the wax and leads to further build up, it can also hurt the ear canals.    Keep your ears dry, if water feels like it builds up in your ears after showering etc. You can simply use a hair dryer to help dry the ears, rather than using q tips or anything inside your ears.      · Diagnosis: Your symptoms are likely caused by laryngo-pharyngealreflux (LPR). This is similar to acid reflux or gastroesophageal reflux disease (GERD) , but instead of just having heartburn, your throat is being irritated due to stomach acid coming up all the way to the back of your voice box.    · Symptoms of LPR include sore throat, white phlegm, frequent throat clearing, cough, hoarseness and sensation of something stuck in throat.   · Many people with LPR do not have symptoms of heartburn ; this is because the throat tissue is more sensitive to stomach acid  · Tips to reduce acid reflux   · Decrease caffeine intake  · Mint, citrus, tomato , red wine and chocolate can worsen reflux   · Do not eat dinner close to when you go to sleep . Allow at least 3 hours between last meal and sleep  · Sleep with head of bed elevated   · Medications to start: I will prescribe you a medication that helps decrease acid production in the stomach. Sometimes this can taken once daily to control symptoms. In some patients, the medication twice daily to control symptoms. It can also take 4 to 6 weeks to notice a change in your symptoms.     It was nice meeting you today, and I look forward to helping you feel better soon. Please don't hesitate to call if you have any other questions or concerns, or if I can be of any assistance in the meantime.       Shari Tamez MD    [facility]    Phone  [phone]    Fax      [phone]        Shari Tamez MD  Otorhinolaryngology

## 2020-06-16 NOTE — PROGRESS NOTES
OTOLARYNGOLOGY CLINIC NOTE  Date:  06/16/2020     Chief complaint:  Chief Complaint   Patient presents with    Sore Throat     off and on for about a month       History of Present Illness  Rose Mayorga is a 34 y.o. female  presenting today for a follow up of throat irritation. I saw the patient last on a virtual visit 5-13-20 and she was seen by Dr. Tejada in October of 2019 for recurrent strep history. She did not meet criteria for tonsillectomy. She was also diagnosed with LPR at that time.this is her first in person appointment with me.   Has been on ranitidine not ppi, does not notice much difference. Still no issues with swallowing.   Will get chest pain sometimes with throat pain  Sometimes gets tonsil stones- has been a while     The patient reported symptoms started in mid may and had been stable . At that time she noted a feeling of needing to clear her throat frequently but no dysphagia, no hoarseness and no otalgia. She was also getting heartburn . She was taking PPI at night randomly. After virtual visit I recommended doing a trial of BID PPI for a short time (2-3 months). She had told me she had the medication at home but she apparently had ranitidine and so was taking that.     Behavioral risk factors for GERD:  She drinks tea but not coffee, does not eat chocolate, eats spicy food on occasion.      She does not have a history of seasonal allergies- does not currently have rhinorrhea/postnasal drip, no nasal congestion. Denies itchy eyes/nose.     Past Medical History  Past Medical History:   Diagnosis Date    History of cervical dysplasia     PCOS (polycystic ovarian syndrome)     Pre-diabetes         Past Surgical History  Past Surgical History:   Procedure Laterality Date    CRYOTHERAPY          Medications  Current Outpatient Medications on File Prior to Visit   Medication Sig Dispense Refill    benzonatate (TESSALON) 100 MG capsule TAKE 1 CAPSULE(100 MG) BY MOUTH THREE TIMES DAILY AS  NEEDED FOR COUGH 30 capsule 0    desloratadine (CLARINEX) 5 mg tablet Take 1 tablet (5 mg total) by mouth once daily. 30 tablet 0    fluticasone propionate (FLONASE) 50 mcg/actuation nasal spray 1 spray (50 mcg total) by Each Nostril route once daily. 15.8 mL 0    ibuprofen (ADVIL,MOTRIN) 600 MG tablet Take 1 tablet (600 mg total) by mouth 3 (three) times daily. 40 tablet 1    levocetirizine (XYZAL) 5 MG tablet Take 1 tablet (5 mg total) by mouth every evening. 30 tablet 11    levonorgestrel (MIRENA) 20 mcg/24 hr (5 years) IUD 1 each by Intrauterine route once.      naproxen (NAPROSYN) 500 MG tablet Take 1 tablet (500 mg total) by mouth 2 (two) times daily as needed (pain). (Patient not taking: Reported on 10/7/2019) 30 tablet 0    ranitidine (ZANTAC) 150 MG tablet Take 1 tablet (150 mg total) by mouth nightly. 30 tablet 3    triamcinolone acetonide 0.1% (KENALOG) 0.1 % cream Apply topically 2 (two) times daily as needed. (Patient not taking: Reported on 10/7/2019) 15 g 0     No current facility-administered medications on file prior to visit.        Review of Systems  Review of Systems   Constitutional: Negative for fever.   HENT: Positive for sore throat. Negative for hearing loss and tinnitus.    Endo/Heme/Allergies: Positive for environmental allergies.        Social History   reports that she has never smoked. She has never used smokeless tobacco. She reports current alcohol use. She reports that she does not use drugs.     Family History  Family History   Problem Relation Age of Onset    Cancer Maternal Grandmother         breast        Physical Exam   Vitals:    06/16/20 0917   BP: 118/78   Temp: 97 °F (36.1 °C)    Body mass index is 33.11 kg/m².            GENERAL: alert, no acute distress.  HEAD: normocephalic.  SKIN: no lesions of skin of head and neck area.  EYES: lids and lashes normal. Pupils equal. No proptosis  EARS: external ear without lesion, normal pinna shape and position.  Left external  auditory canal with normal cerumen, tympanic membrane fully visible, no perforation , no retraction. No middle ear effusion. Ossicles intact. Right ear with cerumen impaction see below note  NOSE: external nose without abnormality, septum grossly midline on anterior rhinoscopy, mild turbinate hypertrophy  ORAL CAVITY/OROPHARYNX: dentition fair, no mass or lesion of gingiva/buccal mucosa/floor of mouth/tongue. tongue midline and mobile.    NECK: trachea midline. No discrete palpable thyroid nodule. No parotid mass nor tenderness. No submandibular gland mass nor tenderness. No scars.  LYMPH NODES:No cervical lymphadenopathy.  RESPIRATORY: no stridor, no stertor. Voice normal. Respirations nonlabored.  NEURO: alert, responds to questions appropriately.   Cranial nerve exam as indicated in above sections and additionally showed  Facial movement symmetric with good eye closure and symmetric smile.   PSYCH:mood appropriate    PROCEDURE NOTE  NAME OF PROCEDURE: Flexible Laryngoscopy, diagnostic  INDICATIONS:throat pain  FINDINGS: cryptic large tonsils extending down to level of epiglottis, findings of LPR -see below    Consent: After procedure was explained in detail and all questions answered, verbal consent was obtained for performing flexible laryngoscopy.  Anesthesia: topical 4% lidocaine  and neosynephrine  Procedure: With patient in seated position, the scope was inserted into the bilateral nasal passageways and then advanced in the left side atraumatically into the nasopharynx to examine the following structures  Nasopharynx: no mass or lesion noted in nasopharynx. Pharyngeal cobblestoning of posterior pharyngeal wall  Oropharynx: base of tongue without  mass or ulceration. Lingual tonsils normal in appearance  Hypopharynx: posterior pharyngeal wall without mass or lesion. No pooling of secretions. Pyriform sinuses visible without mass or lesion  Larynx: epiglottis normal without lesion. False vocal folds without  edema/erythema/lesion. True vocal folds mobile and without lesion. Mild pseudosulcus  interarytenoid edema and erythema . Mild edema/erythema in postcricoid region   Subglottis: visualized portion of subglottis normal in appearance    After examination performed, the scope was removed atraumatically . The patient tolerated the procedure well.photodocumentation obtained, uploaded into media section       Procedure Note   Procedure performed: microscopic examination of ears with cerumen disimpaction    Indication for procedure: unilateral cerumen impaction     Description of procedure:  After verbal consent was obtained, the patient was positioned in semi recumbent position and speculum was placed in the right ear and microscope brought into the field.  The microscope was positioned and magnification adjusted for appropriate visualization. Otologic instruments including various size otologic suctions and curette were used to remove the cerumen from the right  external auditory canals under visualization with the operating microscope. After cleaning, visualization was again performed and at various levels of higher magnification to optimize views of the ear canal, tympanic membrane, ossicles and middle ear space. Findings as indicated below. All portions of the procedure and examination by otomicroscopy were tolerated well without complication.     Findings:  Complete cerumen impaction removed entirely revealing normal external auditory canal; tympanic membrane without bulging, retraction, or perforation; no evidence of middle ear fluid or effusion.         Imaging:  The patient does not have any pertinent and/or recent imaging of the head and neck.     Labs:  No recent labs    Assessment  1. Laryngopharyngeal reflux (LPR)    2. Impacted cerumen of right ear       Plan:  Discussed plan of care with patient in detail and all questions answered. Patient reported understanding of plan of care.   Cryptic tonsils- ? Stones  will treat reflux first, asked pt to monitor if she notices if she is coughing up stones although they could be extruding and she swallows them given how large tonsils are. This could also be causing her throat irritation .     LPR: behavioral modifications, rx for PPI sent to pharmacy- take BID     Cerumen impaction: removed, counseled not to use qtips      Follow up 2-3 months, counseled to stay on PPI until follow up appointment

## 2020-09-18 NOTE — TELEPHONE ENCOUNTER
Informed pt that referral was faxed over to [facility]. Pt verbally understood.  
Pt called stating that she did not get a call back for the referral for plastic surgery from [facility] . Pt called [facility] and would like to go to [facility] . Pt would like referral faxed to [facility] at [phone]. Please advise.  
Counting aloud

## 2020-11-09 ENCOUNTER — TELEPHONE (OUTPATIENT)
Dept: FAMILY MEDICINE | Facility: CLINIC | Age: 35
End: 2020-11-09

## 2020-11-09 NOTE — TELEPHONE ENCOUNTER
----- Message from Chioma Andrade sent at 11/9/2020  7:39 AM CST -----  Type: Sooner Appointment Request       Patient is requesting a sooner appointment. Patient declined first available appointment listed as well as another facility and provider. Patient will not accept being placed on the waitlist and is requesting a message be sent to doctor.       When is the first available appointment?  12/28     Would the patient rather a call back or a response via [facility]? Call Back       Best Call Back Number:  [phone]

## 2021-04-08 ENCOUNTER — OFFICE VISIT (OUTPATIENT)
Dept: FAMILY MEDICINE | Facility: CLINIC | Age: 36
End: 2021-04-08
Payer: MEDICAID

## 2021-04-08 VITALS
TEMPERATURE: 99 F | OXYGEN SATURATION: 97 % | HEART RATE: 76 BPM | DIASTOLIC BLOOD PRESSURE: 70 MMHG | HEIGHT: 67 IN | RESPIRATION RATE: 16 BRPM | BODY MASS INDEX: 33.8 KG/M2 | SYSTOLIC BLOOD PRESSURE: 112 MMHG | WEIGHT: 215.38 LBS

## 2021-04-08 DIAGNOSIS — Z00.00 ANNUAL PHYSICAL EXAM: ICD-10-CM

## 2021-04-08 DIAGNOSIS — J03.00 STREPTOCOCCAL TONSILLITIS: Primary | ICD-10-CM

## 2021-04-08 PROCEDURE — 99214 OFFICE O/P EST MOD 30 MIN: CPT | Mod: S$PBB,,, | Performed by: FAMILY MEDICINE

## 2021-04-08 PROCEDURE — 99999 PR PBB SHADOW E&M-EST. PATIENT-LVL III: ICD-10-PCS | Mod: PBBFAC,,, | Performed by: FAMILY MEDICINE

## 2021-04-08 PROCEDURE — 99213 OFFICE O/P EST LOW 20 MIN: CPT | Mod: PBBFAC,PO | Performed by: FAMILY MEDICINE

## 2021-04-08 PROCEDURE — 99214 PR OFFICE/OUTPT VISIT, EST, LEVL IV, 30-39 MIN: ICD-10-PCS | Mod: S$PBB,,, | Performed by: FAMILY MEDICINE

## 2021-04-08 PROCEDURE — 99999 PR PBB SHADOW E&M-EST. PATIENT-LVL III: CPT | Mod: PBBFAC,,, | Performed by: FAMILY MEDICINE

## 2021-04-08 RX ORDER — NYSTATIN AND TRIAMCINOLONE ACETONIDE 100000; 1 [USP'U]/G; MG/G
OINTMENT TOPICAL
COMMUNITY
Start: 2020-09-24

## 2021-04-08 RX ORDER — KETOROLAC TROMETHAMINE 5 MG/ML
1 SOLUTION OPHTHALMIC 4 TIMES DAILY
COMMUNITY
Start: 2021-03-25

## 2021-04-08 RX ORDER — AMOXICILLIN AND CLAVULANATE POTASSIUM 875; 125 MG/1; MG/1
1 TABLET, FILM COATED ORAL EVERY 12 HOURS
Qty: 14 TABLET | Refills: 0 | Status: SHIPPED | OUTPATIENT
Start: 2021-04-08 | End: 2021-04-15

## 2021-04-16 ENCOUNTER — PATIENT MESSAGE (OUTPATIENT)
Dept: RESEARCH | Facility: HOSPITAL | Age: 36
End: 2021-04-16

## 2021-05-07 ENCOUNTER — LAB VISIT (OUTPATIENT)
Dept: LAB | Facility: HOSPITAL | Age: 36
End: 2021-05-07
Attending: FAMILY MEDICINE
Payer: MEDICAID

## 2021-05-07 ENCOUNTER — OFFICE VISIT (OUTPATIENT)
Dept: FAMILY MEDICINE | Facility: CLINIC | Age: 36
End: 2021-05-07
Payer: MEDICAID

## 2021-05-07 VITALS
OXYGEN SATURATION: 97 % | RESPIRATION RATE: 16 BRPM | WEIGHT: 216.06 LBS | HEIGHT: 67 IN | BODY MASS INDEX: 33.91 KG/M2 | DIASTOLIC BLOOD PRESSURE: 64 MMHG | SYSTOLIC BLOOD PRESSURE: 96 MMHG | HEART RATE: 62 BPM | TEMPERATURE: 99 F

## 2021-05-07 DIAGNOSIS — Z00.00 ANNUAL PHYSICAL EXAM: Primary | ICD-10-CM

## 2021-05-07 DIAGNOSIS — J03.01 RECURRENT STREPTOCOCCAL TONSILLITIS: ICD-10-CM

## 2021-05-07 DIAGNOSIS — Z00.00 ANNUAL PHYSICAL EXAM: ICD-10-CM

## 2021-05-07 LAB
ALBUMIN SERPL BCP-MCNC: 3.8 G/DL (ref 3.5–5.2)
ALP SERPL-CCNC: 84 U/L (ref 55–135)
ALT SERPL W/O P-5'-P-CCNC: 23 U/L (ref 10–44)
ANION GAP SERPL CALC-SCNC: 7 MMOL/L (ref 8–16)
AST SERPL-CCNC: 24 U/L (ref 10–40)
BASOPHILS # BLD AUTO: 0.02 K/UL (ref 0–0.2)
BASOPHILS NFR BLD: 0.3 % (ref 0–1.9)
BILIRUB SERPL-MCNC: 0.4 MG/DL (ref 0.1–1)
BUN SERPL-MCNC: 14 MG/DL (ref 6–20)
CALCIUM SERPL-MCNC: 9.5 MG/DL (ref 8.7–10.5)
CHLORIDE SERPL-SCNC: 106 MMOL/L (ref 95–110)
CHOLEST SERPL-MCNC: 163 MG/DL (ref 120–199)
CHOLEST/HDLC SERPL: 3.1 {RATIO} (ref 2–5)
CO2 SERPL-SCNC: 27 MMOL/L (ref 23–29)
CREAT SERPL-MCNC: 1 MG/DL (ref 0.5–1.4)
DIFFERENTIAL METHOD: ABNORMAL
EOSINOPHIL # BLD AUTO: 0.1 K/UL (ref 0–0.5)
EOSINOPHIL NFR BLD: 2 % (ref 0–8)
ERYTHROCYTE [DISTWIDTH] IN BLOOD BY AUTOMATED COUNT: 14.9 % (ref 11.5–14.5)
EST. GFR  (AFRICAN AMERICAN): >60 ML/MIN/1.73 M^2
EST. GFR  (NON AFRICAN AMERICAN): >60 ML/MIN/1.73 M^2
ESTIMATED AVG GLUCOSE: 103 MG/DL (ref 68–131)
GLUCOSE SERPL-MCNC: 87 MG/DL (ref 70–110)
HBA1C MFR BLD: 5.2 % (ref 4–5.6)
HCT VFR BLD AUTO: 45.2 % (ref 37–48.5)
HDLC SERPL-MCNC: 53 MG/DL (ref 40–75)
HDLC SERPL: 32.5 % (ref 20–50)
HGB BLD-MCNC: 14.3 G/DL (ref 12–16)
IMM GRANULOCYTES # BLD AUTO: 0.01 K/UL (ref 0–0.04)
IMM GRANULOCYTES NFR BLD AUTO: 0.2 % (ref 0–0.5)
LDLC SERPL CALC-MCNC: 98.2 MG/DL (ref 63–159)
LYMPHOCYTES # BLD AUTO: 2.8 K/UL (ref 1–4.8)
LYMPHOCYTES NFR BLD: 45.8 % (ref 18–48)
MCH RBC QN AUTO: 28.1 PG (ref 27–31)
MCHC RBC AUTO-ENTMCNC: 31.6 G/DL (ref 32–36)
MCV RBC AUTO: 89 FL (ref 82–98)
MONOCYTES # BLD AUTO: 0.7 K/UL (ref 0.3–1)
MONOCYTES NFR BLD: 11 % (ref 4–15)
NEUTROPHILS # BLD AUTO: 2.4 K/UL (ref 1.8–7.7)
NEUTROPHILS NFR BLD: 40.7 % (ref 38–73)
NONHDLC SERPL-MCNC: 110 MG/DL
NRBC BLD-RTO: 0 /100 WBC
PLATELET # BLD AUTO: 250 K/UL (ref 150–450)
PMV BLD AUTO: 10.4 FL (ref 9.2–12.9)
POTASSIUM SERPL-SCNC: 4.2 MMOL/L (ref 3.5–5.1)
PROT SERPL-MCNC: 7.3 G/DL (ref 6–8.4)
RBC # BLD AUTO: 5.08 M/UL (ref 4–5.4)
SODIUM SERPL-SCNC: 140 MMOL/L (ref 136–145)
TRIGL SERPL-MCNC: 59 MG/DL (ref 30–150)
TSH SERPL DL<=0.005 MIU/L-ACNC: 1.88 UIU/ML (ref 0.4–4)
WBC # BLD AUTO: 6 K/UL (ref 3.9–12.7)

## 2021-05-07 PROCEDURE — 99214 OFFICE O/P EST MOD 30 MIN: CPT | Mod: PBBFAC,PO | Performed by: FAMILY MEDICINE

## 2021-05-07 PROCEDURE — 99999 PR PBB SHADOW E&M-EST. PATIENT-LVL IV: ICD-10-PCS | Mod: PBBFAC,,, | Performed by: FAMILY MEDICINE

## 2021-05-07 PROCEDURE — 99395 PREV VISIT EST AGE 18-39: CPT | Mod: S$PBB,,, | Performed by: FAMILY MEDICINE

## 2021-05-07 PROCEDURE — 99395 PR PREVENTIVE VISIT,EST,18-39: ICD-10-PCS | Mod: S$PBB,,, | Performed by: FAMILY MEDICINE

## 2021-05-07 PROCEDURE — 83036 HEMOGLOBIN GLYCOSYLATED A1C: CPT | Performed by: FAMILY MEDICINE

## 2021-05-07 PROCEDURE — 84443 ASSAY THYROID STIM HORMONE: CPT | Performed by: FAMILY MEDICINE

## 2021-05-07 PROCEDURE — 80061 LIPID PANEL: CPT | Performed by: FAMILY MEDICINE

## 2021-05-07 PROCEDURE — 99999 PR PBB SHADOW E&M-EST. PATIENT-LVL IV: CPT | Mod: PBBFAC,,, | Performed by: FAMILY MEDICINE

## 2021-05-07 PROCEDURE — 80053 COMPREHEN METABOLIC PANEL: CPT | Performed by: FAMILY MEDICINE

## 2021-05-07 PROCEDURE — 85025 COMPLETE CBC W/AUTO DIFF WBC: CPT | Performed by: FAMILY MEDICINE

## 2021-05-07 PROCEDURE — 36415 COLL VENOUS BLD VENIPUNCTURE: CPT | Mod: PO | Performed by: FAMILY MEDICINE

## 2021-06-02 ENCOUNTER — TELEPHONE (OUTPATIENT)
Dept: FAMILY MEDICINE | Facility: CLINIC | Age: 36
End: 2021-06-02

## 2021-06-03 ENCOUNTER — TELEPHONE (OUTPATIENT)
Dept: FAMILY MEDICINE | Facility: CLINIC | Age: 36
End: 2021-06-03

## 2021-08-28 ENCOUNTER — HOSPITAL ENCOUNTER (EMERGENCY)
Facility: HOSPITAL | Age: 36
Discharge: HOME | End: 2021-08-28
Attending: EMERGENCY MEDICINE
Payer: MEDICAID

## 2021-08-28 VITALS
RESPIRATION RATE: 18 BRPM | SYSTOLIC BLOOD PRESSURE: 105 MMHG | WEIGHT: 213 LBS | HEART RATE: 91 BPM | OXYGEN SATURATION: 99 % | DIASTOLIC BLOOD PRESSURE: 70 MMHG | TEMPERATURE: 100 F | HEIGHT: 67 IN | BODY MASS INDEX: 33.43 KG/M2

## 2021-08-28 DIAGNOSIS — U07.1 COVID-19: Primary | ICD-10-CM

## 2021-08-28 LAB
B-HCG UR QL: NEGATIVE
CTP QC/QA: YES
CTP QC/QA: YES
SARS-COV-2 RDRP RESP QL NAA+PROBE: POSITIVE

## 2021-08-28 PROCEDURE — U0002 COVID-19 LAB TEST NON-CDC: HCPCS | Performed by: EMERGENCY MEDICINE

## 2021-08-28 PROCEDURE — 25000003 PHARM REV CODE 250: Performed by: NURSE PRACTITIONER

## 2021-08-28 PROCEDURE — 81025 URINE PREGNANCY TEST: CPT | Performed by: NURSE PRACTITIONER

## 2021-08-28 PROCEDURE — 99284 EMERGENCY DEPT VISIT MOD MDM: CPT | Mod: 25

## 2021-08-28 RX ORDER — ACETAMINOPHEN 500 MG
1000 TABLET ORAL
Status: COMPLETED | OUTPATIENT
Start: 2021-08-28 | End: 2021-08-28

## 2021-08-28 RX ORDER — ALBUTEROL SULFATE 90 UG/1
1-2 AEROSOL, METERED RESPIRATORY (INHALATION) EVERY 6 HOURS PRN
Qty: 8 G | Refills: 0 | Status: SHIPPED | OUTPATIENT
Start: 2021-08-28 | End: 2022-08-28

## 2021-08-28 RX ORDER — IBUPROFEN 600 MG/1
600 TABLET ORAL
Status: COMPLETED | OUTPATIENT
Start: 2021-08-28 | End: 2021-08-28

## 2021-08-28 RX ORDER — IBUPROFEN 600 MG/1
600 TABLET ORAL EVERY 6 HOURS PRN
Qty: 20 TABLET | Refills: 0 | Status: SHIPPED | OUTPATIENT
Start: 2021-08-28

## 2021-08-28 RX ORDER — ONDANSETRON 4 MG/1
4 TABLET, FILM COATED ORAL EVERY 6 HOURS PRN
Qty: 12 TABLET | Refills: 0 | Status: SHIPPED | OUTPATIENT
Start: 2021-08-28

## 2021-08-28 RX ORDER — ACETAMINOPHEN 500 MG
1000 TABLET ORAL EVERY 6 HOURS PRN
Qty: 20 TABLET | Refills: 0 | Status: SHIPPED | OUTPATIENT
Start: 2021-08-28

## 2021-08-28 RX ADMIN — IBUPROFEN 600 MG: 600 TABLET ORAL at 11:08

## 2021-08-28 RX ADMIN — ACETAMINOPHEN 1000 MG: 500 TABLET, FILM COATED ORAL at 11:08

## 2021-08-31 ENCOUNTER — NURSE TRIAGE (OUTPATIENT)
Dept: ADMINISTRATIVE | Facility: CLINIC | Age: 36
End: 2021-08-31

## 2021-09-02 ENCOUNTER — NURSE TRIAGE (OUTPATIENT)
Dept: ADMINISTRATIVE | Facility: CLINIC | Age: 36
End: 2021-09-02

## 2021-09-02 ENCOUNTER — TELEPHONE (OUTPATIENT)
Dept: INFECTIOUS DISEASES | Facility: HOSPITAL | Age: 36
End: 2021-09-02

## 2021-09-04 ENCOUNTER — NURSE TRIAGE (OUTPATIENT)
Dept: ADMINISTRATIVE | Facility: CLINIC | Age: 36
End: 2021-09-04

## 2021-09-07 ENCOUNTER — INFUSION (OUTPATIENT)
Dept: INFECTIOUS DISEASES | Facility: HOSPITAL | Age: 36
End: 2021-09-07
Attending: INTERNAL MEDICINE
Payer: MEDICAID

## 2021-09-07 VITALS
OXYGEN SATURATION: 95 % | HEIGHT: 67 IN | DIASTOLIC BLOOD PRESSURE: 72 MMHG | TEMPERATURE: 98 F | WEIGHT: 213 LBS | HEART RATE: 60 BPM | RESPIRATION RATE: 18 BRPM | BODY MASS INDEX: 33.43 KG/M2 | SYSTOLIC BLOOD PRESSURE: 112 MMHG

## 2021-09-07 DIAGNOSIS — U07.1 COVID-19: Primary | ICD-10-CM

## 2021-09-07 PROCEDURE — 25000003 PHARM REV CODE 250: Performed by: EMERGENCY MEDICINE

## 2021-09-07 PROCEDURE — 63600175 PHARM REV CODE 636 W HCPCS: Performed by: EMERGENCY MEDICINE

## 2021-09-07 PROCEDURE — M0243 CASIRIVI AND IMDEVI INFUSION: HCPCS | Performed by: EMERGENCY MEDICINE

## 2021-09-07 RX ORDER — ONDANSETRON 4 MG/1
4 TABLET, ORALLY DISINTEGRATING ORAL ONCE AS NEEDED
Status: ACTIVE | OUTPATIENT
Start: 2021-09-07 | End: 2033-02-03

## 2021-09-07 RX ORDER — ALBUTEROL SULFATE 90 UG/1
2 AEROSOL, METERED RESPIRATORY (INHALATION)
Status: ACTIVE | OUTPATIENT
Start: 2021-09-07

## 2021-09-07 RX ORDER — ACETAMINOPHEN 325 MG/1
650 TABLET ORAL ONCE AS NEEDED
Status: ACTIVE | OUTPATIENT
Start: 2021-09-07 | End: 2033-02-03

## 2021-09-07 RX ORDER — DIPHENHYDRAMINE HYDROCHLORIDE 50 MG/ML
25 INJECTION INTRAMUSCULAR; INTRAVENOUS ONCE AS NEEDED
Status: ACTIVE | OUTPATIENT
Start: 2021-09-07 | End: 2033-02-03

## 2021-09-07 RX ORDER — EPINEPHRINE 0.3 MG/.3ML
0.3 INJECTION SUBCUTANEOUS
Status: ACTIVE | OUTPATIENT
Start: 2021-09-07

## 2021-09-07 RX ORDER — SODIUM CHLORIDE 0.9 % (FLUSH) 0.9 %
10 SYRINGE (ML) INJECTION
Status: ACTIVE | OUTPATIENT
Start: 2021-09-07

## 2021-09-07 RX ADMIN — CASIRIVIMAB AND IMDEVIMAB 600 MG: 600; 600 INJECTION, SOLUTION, CONCENTRATE INTRAVENOUS at 08:09

## 2021-09-28 NOTE — PROGRESS NOTES
Subjective:       Patient ID: Rose Mayorga is a 32 y.o. female.    Chief Complaint: Cough (1- 2 week) and Breast Pain (under left breast level 5 pain for months)    Cough   This is a new problem. The current episode started in the past 7 days. The problem has been unchanged. The problem occurs constantly. The cough is non-productive. Associated symptoms include postnasal drip and shortness of breath. Pertinent negatives include no ear congestion, ear pain, fever, rhinorrhea or wheezing. Treatments tried: delsym, robitussin. The treatment provided mild relief.   Mass: under left breast above ribs. Present for over 1 year. Comes and goes. Irritative when swollen.   Thigh abnormality: began about 1 year ago. Pt had injections in her buttocks to enlarge them. After injections she noticed hardening around her bilateral hips.   Social History     Social History    Marital status: Single     Spouse name: N/A    Number of children: N/A    Years of education: N/A     Occupational History    Not on file.     Social History Main Topics    Smoking status: Never Smoker    Smokeless tobacco: Never Used    Alcohol use Yes      Comment: socially    Drug use: No    Sexual activity: Yes     Partners: Male     Birth control/ protection: Condom     Other Topics Concern    Not on file     Social History Narrative    No narrative on file       Review of Systems   Constitutional: Negative for fever.   HENT: Positive for postnasal drip. Negative for ear pain and rhinorrhea.    Respiratory: Positive for cough and shortness of breath. Negative for wheezing.        Objective:      Physical Exam   Constitutional: She is oriented to person, place, and time. She appears well-developed and well-nourished. No distress.   Cardiovascular: Normal rate and regular rhythm.    Pulmonary/Chest: Effort normal and breath sounds normal.       Neurological: She is alert and oriented to person, place, and time.   Skin: Skin is warm and dry. She  is not diaphoretic.   Psychiatric: She has a normal mood and affect. Her behavior is normal.   Vitals reviewed.      Assessment:       1. Mass of chest wall, left    2. Cough    3. Acquired deformity of hip, unspecified laterality        Plan:         Rose was seen today for cough and breast pain.    Diagnoses and all orders for this visit:    Mass of chest wall, left  -     US Soft Tissue Chest_Upper Back; Future    Cough  -     levocetirizine (XYZAL) 5 MG tablet; Take 1 tablet (5 mg total) by mouth every evening.  -     benzonatate (TESSALON) 200 MG capsule; Take 1 capsule (200 mg total) by mouth 3 (three) times daily as needed.    Acquired deformity of hip, unspecified laterality  -     Ambulatory referral to Plastic Surgery         69

## 2022-01-11 ENCOUNTER — TELEPHONE (OUTPATIENT)
Dept: FAMILY MEDICINE | Facility: CLINIC | Age: 37
End: 2022-01-11
Payer: MEDICAID

## 2022-01-11 NOTE — TELEPHONE ENCOUNTER
----- Message from Sanchez Ragland sent at 1/11/2022  1:18 PM CST -----  Regarding: request to be tested for strep throat  Type:  Needs Medical Advice/Symptom-based Call    Who Called: Rose     Symptoms (please be specific): sore throat     How long has patient had these symptoms:  2 days     Would the patient rather a call back or a response via [facility]? Call back     Best Call Back Number: [phone]    Additional Information: the patient is requesting to come in and be swabbed for strep throat. Patient stated that that is the only symptom she is having and does not think it could be covid. Please call at your earliest convenience.

## 2022-01-18 ENCOUNTER — OFFICE VISIT (OUTPATIENT)
Dept: FAMILY MEDICINE | Facility: CLINIC | Age: 37
End: 2022-01-18
Payer: MEDICAID

## 2022-01-18 DIAGNOSIS — J06.9 VIRAL URI WITH COUGH: ICD-10-CM

## 2022-01-18 PROCEDURE — 1159F MED LIST DOCD IN RCRD: CPT | Mod: CPTII,95,, | Performed by: INTERNAL MEDICINE

## 2022-01-18 PROCEDURE — 1159F PR MEDICATION LIST DOCUMENTED IN MEDICAL RECORD: ICD-10-PCS | Mod: CPTII,95,, | Performed by: INTERNAL MEDICINE

## 2022-01-18 PROCEDURE — 99214 OFFICE O/P EST MOD 30 MIN: CPT | Mod: 95,,, | Performed by: INTERNAL MEDICINE

## 2022-01-18 PROCEDURE — 1160F RVW MEDS BY RX/DR IN RCRD: CPT | Mod: CPTII,95,, | Performed by: INTERNAL MEDICINE

## 2022-01-18 PROCEDURE — 99214 PR OFFICE/OUTPT VISIT, EST, LEVL IV, 30-39 MIN: ICD-10-PCS | Mod: 95,,, | Performed by: INTERNAL MEDICINE

## 2022-01-18 PROCEDURE — 1160F PR REVIEW ALL MEDS BY PRESCRIBER/CLIN PHARMACIST DOCUMENTED: ICD-10-PCS | Mod: CPTII,95,, | Performed by: INTERNAL MEDICINE

## 2022-01-18 RX ORDER — FLUTICASONE PROPIONATE 50 MCG
1 SPRAY, SUSPENSION (ML) NASAL DAILY
Qty: 16 G | Refills: 0 | Status: SHIPPED | OUTPATIENT
Start: 2022-01-18

## 2022-01-18 RX ORDER — PREDNISONE 20 MG/1
TABLET ORAL
Qty: 10 TABLET | Refills: 0 | Status: SHIPPED | OUTPATIENT
Start: 2022-01-18

## 2022-01-18 NOTE — PROGRESS NOTES
Patient ID: Rose Mayorga is a pleasant 36 y.o. Black or  female.    Chief Complaint: Sore Throat and Nasal Congestion      Patient is a new pt to me but established pt from Dr. Brunner.    This visit is a Telemedicine Video Visit due to the COVID-19 epidemics.  The patient location is: [place]  The chief complaint leading to consultation is: sore throat and nasal congestion    Visit type: audiovisual    Face to Face time with patient: 10 min  30 minutes of total time spent on the encounter, which includes face to face time and non-face to face time preparing to see the patient (eg, review of tests), Obtaining and/or reviewing separately obtained history, Documenting clinical information in the electronic or other health record, Independently interpreting results (not separately reported) and communicating results to the patient/family/caregiver, or Care coordination (not separately reported).         Each patient to whom he or she provides medical services by telemedicine is:  (1) informed of the relationship between the physician and patient and the respective role of any other health care provider with respect to management of the patient; and (2) notified that he or she may decline to receive medical services by telemedicine and may withdraw from such care at any time.      HPI     She started to cough and have a sore throat 1 week ago, no fever, presence of runny nose and postnasal drip.  No loss of taste and smell, no shortness of breath, no chest pain, no headache, no muscle pain or chills.  Nobody sick around her. She got tested for COVID (rapid test) during the week, came back negative.  She has been taking cough lozenges and ibuprofen.  She starts to feel better.  Main concern today is rather nasal congestion and some sensation of mild sore throat.  She had COVID in September and required monoclonal ab infusion. She recovered well.  She is not vaccinated for COVID or flu and does not  plan to.    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 Negative for drooling, ear discharge, ear pain and trouble swallowing.    Respiratory: Positive for cough. Negative for shortness of breath and stridor.    Gastrointestinal: Negative for abdominal pain, diarrhea and vomiting.   Musculoskeletal: Negative for neck pain.   Neurological: Negative for headaches.       Objective:      Physical Exam    There were no vitals filed for this visit.  There is no height or weight on file to calculate BMI.    Pleasant, NAD, does not cough during the visit, speaks in full sentences.    RESULTS: Reviewed labs from last 12 months    Last Lab Results:     Lab Results   Component Value Date    WBC 6.00 05/07/2021    HGB 14.3 05/07/2021    HCT 45.2 05/07/2021     05/07/2021     05/07/2021    K 4.2 05/07/2021     05/07/2021    CO2 27 05/07/2021    BUN 14 05/07/2021    CREATININE 1.0 05/07/2021    CALCIUM 9.5 05/07/2021    ALBUMIN 3.8 05/07/2021    AST 24 05/07/2021    ALT 23 05/07/2021    CHOL 163 05/07/2021    TRIG 59 05/07/2021    HDL 53 05/07/2021    LDLCALC 98.2 05/07/2021    HGBA1C 5.2 05/07/2021    TSH 1.881 05/07/2021       Assessment:       1. Viral URI with cough        Plan:   Rose was seen today for sore throat and nasal congestion.    Diagnoses and all orders for this visit:    Viral URI with cough  -     predniSONE (DELTASONE) 20 MG tablet; Take 2 tabs in the morning on a full stomach  -     fluticasone propionate (FLONASE) 50 mcg/actuation nasal spray; 1 spray (50 mcg total) by Each Nostril route once daily.    See HPI, patient feeling better, had COVID test negative last week.  Will order a short course of steroids and she can also use Flonase.  She can use saline nasal spray. Advised re: symptoms and signs to monitor. She can contact me if any  concern.    No follow-ups on file.    This note was created by combination of typed  and M-Modal dictation.  Transcription errors may be present.  If there are any questions, please contact me.

## 2022-03-03 ENCOUNTER — PATIENT OUTREACH (OUTPATIENT)
Dept: ADMINISTRATIVE | Facility: HOSPITAL | Age: 37
End: 2022-03-03
Payer: MEDICAID

## 2023-04-12 ENCOUNTER — PATIENT MESSAGE (OUTPATIENT)
Dept: ADMINISTRATIVE | Facility: HOSPITAL | Age: 38
End: 2023-04-12
Payer: MEDICAID

## 2023-10-19 ENCOUNTER — PATIENT OUTREACH (OUTPATIENT)
Dept: ADMINISTRATIVE | Facility: HOSPITAL | Age: 38
End: 2023-10-19
Payer: MEDICAID

## 2023-10-19 NOTE — PROGRESS NOTES
Population Health Chart Review & Patient Outreach Details:     Reason for Outreach Encounter:     [x]  Non-Compliant Report   []  Payor Report ([employer], [employer], BCBS, MSS[employer], MCIP, [employer], etc.)   []  Pre-Visit Chart Review     Updates Requested / Reviewed:     [x]  Care Everywhere    []     []  External Sources ([facility], [facility], [facility], etc.)   [x]  Care Team Updated    Patient Outreach Method:    [x]  Telephone Outreach Completed   [] Successful   [x] Left Voicemail   [] Unable to Contact (wrong number, no voicemail)  []  [facility] Outreach Sent  []  Letter Outreach Mailed  []  Fax Sent for External Records  []  External Records Upload    Health Maintenance Topics Addressed and Outreach Outcomes / Actions Taken:        []      Breast Cancer Screening []  Mammo Scheduled      []  External Records Requested     []  Added Reminder to Complete to Upcoming Primary Care Appt Notes     []  Patient Declined     []  Patient Will Call Back to Schedule     []  Patient Will Schedule with External Provider / Order Routed if Applicable             [x]       Cervical Cancer Screening []  Pap Scheduled      []  External Records Requested     []  Added Reminder to Complete to Upcoming Primary Care Appt Notes     []  Patient Declined     []  Patient Will Call Back to Schedule     []  Patient Will Schedule with External Provider               []          Colorectal Cancer Screening []  Colonoscopy Case Request or Referral Placed     []  External Records Requested     []  Added Reminder to Complete to Upcoming Primary Care Appt Notes     []  Patient Declined     []  Patient Will Call Back to Schedule     []  Patient Will Schedule with External Provider     []  Fit Kit Mailed (add the SmartPhrase under additional notes)     []  Reminded Patient to Complete Home Test             []      Diabetic Eye Exam []  Eye Camera Scheduled or Optometry Referral Placed     []  External Records Requested     []  Added Reminder to Complete to  Upcoming Primary Care Appt Notes     []  Patient Declined     []  Patient Will Call Back to Schedule     []  Patient Will Schedule with External Provider             []      Blood Pressure Control []  Primary Care Follow Up Visit Scheduled     []  Remote Blood Pressure Reading Captured     []  Added Reminder to Complete to Upcoming Primary Care Appt Notes     []  Patient Declined     []  Patient Will Call Back / Patient Will Send Portal Message with Reading     []  Patient Will Call Back to Schedule Provider Visit             []       HbA1c & Other Labs []  Lab Appt Scheduled for Due Labs     []  Primary Care Follow Up Visit Scheduled      []  Reminded Patient to Complete Home Test     []  Added Reminder to Complete to Upcoming Primary Care Appt Notes     []  Patient Declined     []  Patient Will Call Back to Schedule     []  Patient Will Schedule with External Provider / Order Routed if Applicable           [x]    Schedule Primary Care Appt []  Primary Care Appt Scheduled     []  Patient Declined     []  Patient Will Call Back to Schedule     []  Pt Established with External Provider & Updated Care Team             []      Medication Adherence []  Primary Care Appointment Scheduled     []  Added Reminder to Upcoming Primary Care Appt Notes     []  Patient Reminded to  Prescription     []  Patient Declined, Provider Notified if Needed     []  Sent Provider Message to Review and/or Add Exclusion to Problem List             []      Osteoporosis Screening []  DXA Appointment Scheduled     []  External Records Requested     []  Added Reminder to Complete to Upcoming Primary Care Appt Notes     []  Patient Declined     []  Patient Will Call Back to Schedule     []  Patient Will Schedule with External Provider / Order Routed if Applicable     Additional Care Coordinator Notes:     Need to schedule visit with pcp.   Due for overdue HM (pap smear), need to get recent records if already done.     Further Action Needed  If Patient Returns Outreach:

## 2024-02-22 ENCOUNTER — PATIENT OUTREACH (OUTPATIENT)
Dept: ADMINISTRATIVE | Facility: HOSPITAL | Age: 39
End: 2024-02-22
Payer: MEDICAID

## 2024-02-22 NOTE — PROGRESS NOTES
Population Health Chart Review & Patient Outreach Details    Need to schedule visit with PCP before 2024, considered as a new patient after date, will need to schedule as new patient slot if schedule after date.  After 3 years of not seen, consider as new patient.  Left message for patient to return call.  Further Action Needed If Patient Returns Outreach:      Please advise of message above.      Updates Requested / Reviewed:     [x]  Care Everywhere    []     []  External Sources ([facility], [facility], [facility], etc.)    [] [facility]   [] [facility]   [] Other:    []  Care Team Updated   []  Removed  or Duplicate Orders   []  Immunization Reconciliation Completed / Queried    [] [place]   [] [place]   [] [place]   [] [place]      Health Maintenance Topics Addressed and Outreach Outcomes / Actions Taken:             Breast Cancer Screening []  Mammogram Order Placed    []  Mammogram Screening Scheduled    []  External Records Requested & Care Team Updated if Applicable    []  External Records Uploaded & Care Team Updated if Applicable    []  Pt Declined Scheduling Mammogram    []  Pt Will Schedule with External Provider / Order Routed & Care Team Updated if Applicable              Cervical Cancer Screening []  Pap Smear Scheduled in Primary Care or OBGYN    []  External Records Requested & Care Team Updated if Applicable       []  External Records Uploaded, Care Team Updated, & History Updated if Applicable    []  Patient Declined Scheduling Pap Smear    []  Patient Will Schedule with External Provider & Care Team Updated if Applicable                  Colorectal Cancer Screening []  Colonoscopy Case Request / Referral / Home Test Order Placed    []  External Records Requested & Care Team Updated if Applicable    []  External Records Uploaded, Care Team Updated, & History Updated if Applicable    []  Patient Declined Completing Colon Cancer Screening    []  Patient Will Schedule with External Provider &  Care Team Updated if Applicable    []  Fit Kit Mailed (add the SmartPhrase under additional notes)    []  Reminded Patient to Complete Home Test                Diabetic Eye Exam []  Eye Exam Screening Order Placed    []  Eye Camera Scheduled or Optometry/Ophthalmology Referral Placed    []  External Records Requested & Care Team Updated if Applicable    []  External Records Uploaded, Care Team Updated, & History Updated if Applicable    []  Patient Declined Scheduling Eye Exam    []  Patient Will Schedule with External Provider & Care Team Updated if Applicable             Blood Pressure Control []  Primary Care Follow Up Visit Scheduled     []  Remote Blood Pressure Reading Captured    []  Patient Declined Remote Reading or Scheduling Appt - Escalated to PCP    []  Patient Will Call Back or Send Portal Message with Reading                 HbA1c & Other Labs []  Overdue Lab(s) Ordered    []  Overdue Lab(s) Scheduled    []  External Records Uploaded & Care Team Updated if Applicable    []  Primary Care Follow Up Visit Scheduled     []  Reminded Patient to Complete A1c Home Test    []  Patient Declined Scheduling Labs or Will Call Back to Schedule    []  Patient Will Schedule with External Provider / Order Routed, & Care Team Updated if Applicable           Primary Care Appointment []  Primary Care Appt Scheduled    []  Patient Declined Scheduling or Will Call Back to Schedule    []  Pt Established with External Provider, Updated Care Team, & Informed Pt to Notify Payor if Applicable           Medication Adherence /    Statin Use []  Primary Care Appointment Scheduled    []  Patient Reminded to  Prescription    []  Patient Declined, Provider Notified if Needed    []  Sent Provider Message to Review to Evaluate Pt for Statin, Add Exclusion Dx Codes, Document   Exclusion in Problem List, Change Statin Intensity Level to Moderate or High Intensity if Applicable                Osteoporosis Screening []  Dexa Order  Placed    []  Dexa Appointment Scheduled    []  External Records Requested & Care Team Updated    []  External Records Uploaded, Care Team Updated, & History Updated if Applicable    []  Patient Declined Scheduling Dexa or Will Call Back to Schedule    []  Patient Will Schedule with External Provider / Order Routed & Care Team Updated if Applicable       Additional Notes: